# Patient Record
Sex: FEMALE | Race: WHITE | Employment: FULL TIME | URBAN - METROPOLITAN AREA
[De-identification: names, ages, dates, MRNs, and addresses within clinical notes are randomized per-mention and may not be internally consistent; named-entity substitution may affect disease eponyms.]

---

## 2017-03-01 ENCOUNTER — HOSPITAL ENCOUNTER (OUTPATIENT)
Dept: PHYSICAL THERAPY | Age: 47
Discharge: HOME | End: 2017-03-01
Payer: COMMERCIAL

## 2017-03-01 PROCEDURE — 97165 OT EVAL LOW COMPLEX 30 MIN: CPT

## 2017-03-01 PROCEDURE — 97018 PARAFFIN BATH THERAPY: CPT

## 2017-03-01 PROCEDURE — 97035 APP MDLTY 1+ULTRASOUND EA 15: CPT

## 2017-03-01 NOTE — PROGRESS NOTES
Hand Therapy Evaluation and Daily Note    Patient Name: Codey Sampson  Date:3/1/2017  : 1970  Age: 40 y.o.y/o  [x]  Patient  Verified  Payor: Elvis Jeter / Plan: [email or web address] / Product Type: HMO /    Referring Provider: MD Orion Ho MD Visit:  3 weeks  Onset Date:  2016- original injury,  Pain re-occurred 17    In time:1315  Out time:1415  Total Treatment Time (min): 60  Visit #: 1 of 8    Treatment Area: Left hand pain [M79.642]    Precautions: Latex    Hand Dominance: right handed   Hand Involved: left    Total Evaluation Time:  40    History of Present Condition:  Patient is a 40 y.o. female with a chief complaint of pain, edema and limited functional use of the left hand. Original injury was a hyperextension injury while trying to tighten the girth strap on a saddle while tacking up her horse. Pain re-occured approximately 1 week ago while doing other activities including lifting and carrying heavy objects. She reports it was a burning pain that continued in to the evening after the activities and now it hasn't resolved. Pain Rating:   Current: (0-no pain 10-debilitating pain) 4 / 10   At best: (0-no pain 10-debilitating pain) 4 / 10  At worst: (0-no pain 10-debilitating pain) 7 / 10  Location: left hand  Type:  moderate and localized   Better with: rest  Worse with: use    Medications/Allergies/Past Medical History:  See chart; reviewed with patient. Latex, asthma     Diagnostic Tests: x-ray- no fracture noted    Prior Level of Function: Independent without limitations in ADL and IADL activities. Current Level of Function:  Limited functional use of the left hand    Social History: , 2 children    Occupation/Job Requirements: Self employeed    Observation: Noted edema in MP jts of the IF and MF  Scar/incision:  NA  Location:  Left hand     Palpation:  Pain in the left hand at the MP jt.     Range of Motion:   AROM of the left hand in within normal limits    Strength:  NT    Sensation:   No problems reported    Edema: Mild edema noted at the MP jts on the left hand    Special Tests:    Provocative test: Positive pain with lateral stress test to the MF on the radial side. ADLs  Feeding:        []MaxA   []ModA   []Dutch   [] CGA   []SBA   [x]Carlie   []Independent  UE Dressing:       []MaxA   []ModA   []Dutch   [] CGA   []SBA   [x]Carlie   []Independent  LE Dressing:       []MaxA   []ModA   []Dutch   [] CGA   []SBA   [x]Carlie   []Independent  Grooming:       []MaxA   []ModA   []Dutch   [] CGA   []SBA   [x]Carlie   []Independent  Toileting:       []MaxA   []ModA   []Dutch   [] CGA   []SBA   [x]Carlie   []Independent  Bathing:       []MaxA   []ModA   []Dutch   [] CGA   []SBA   [x]Carlie   []Independent  Light Meal Prep:    []MaxA   []ModA   []Dutch   [] CGA   []SBA   [x]Carlie   []Independent  Household/Other: []MaxA   []ModA   []Dutch   [] CGA   []SBA   [x]Carlie   []Independent  Adaptive Equip:     []MaxA   []ModA   []Dutch   [] CGA   []SBA   []Carlie   []Independent  Driving:       []MaxA   []ModA   []Dutch   [] CGA   []SBA   [x]Carlie   []Independent      Todays Treatment:  Patient received an initial evaluation today followed by education as to diagnosis, precautions and treatment plan. Patient was provided with a basic home exercise program including ana m strapping with wear, care and precautions . OBJECTIVE    Modality rationale: decrease pain and increase tissue extensibility to improve the patients ability to flex and extend the digits of the left hand without pain.    Min Type Additional Details    [] Estim:  []Unatt       []IFC  []Premod                        []Other:  []w/ice   []w/heat  Position:  Location:    [] Estim: []Att    []TENS instruct  []NMES                    []Other:  []w/US   []w/ice   []w/heat  Position:  Location:    []  Traction: [] Cervical       []Lumbar                       [] Prone          []Supine                       []Intermittent []Continuous Lbs:  [] before manual  [] after manual   8 [x]  Ultrasound: [x]Continuous   [] Pulsed                           []1MHz   [x]3MHz W/cm2:  1.0  Location: Dorsum of the left hand    []  Iontophoresis with dexamethasone         Location: [] Take home patch   [] In clinic   10 []  Ice     [x]  Heat: Paraffin  []  Ice massage  []  Laser   []  Anodyne Position:resting  Location: left hand    []  Laser with stim  []  Other:  Position:  Location:    []  Vasopneumatic Device Pressure:       [] lo [] med [] hi   Temperature: [] lo [] med [] hi   [x] Skin assessment post-treatment:  [x]intact [x]redness- no adverse reaction    []redness - adverse reaction:       10 min Manual Therapy: IASTM with Graston tool # 3, using sweeping and fanning techniques   Rationale: decrease pain, increase tissue extensibility and decrease edema  to left hand    5 min Orthotic/Splinting: ana m wrap provided and patient educated on wear, care and precautions   Rationale: protection  to improve the patients ability to reduce stress and strain on soft tissue injury    5 min Self Care/Home Management: activity modification   Rationale: education  to improve the patients ability to reduce chances for re-injury. With   [] TE   [] TA   [] neuro   [] other: Patient Education: [x] Review HEP    [] Progressed/Changed HEP based on:   [] positioning   [] body mechanics   [] transfers   [] heat/ice application   [] Splint wear/care   [] Sensory re-education   [] scar management      [] other:      Pain Level (0-10 scale) post treatment: 2-3/10    Patient will continue to benefit from skilled OT services to address strength deficits and analyze and address soft tissue restrictions to attain goals. Assessment: Patient is a 40 y.o. female with a chief complaint of pain, edema and limited functional use of the left hand.   Original injury was a hyperextension injury while trying to tighten the girth strap on a saddle while tacking up her horse.  Pain re-occured approximately 1 week ago while doing other activities including lifting and carrying heavy objects. She reports it was a burning pain that continued in to the evening after the activities and now it hasn't resolved. Noted edema between the MP jts of the IF and MF. Positive pain with lateral stress test to the MF on the radial side. Patient received an initial evaluation today followed by education as to diagnosis, precautions and treatment plan and fitted with a ana m wrap for protection with functional tasks. Patient was provided with a basic home exercise program including ana m strapping with wear, care and precautions . Evaluation Complexity: History LOW Complexity : Brief history review  Examination LOW Complexity : 1-3 performance deficits relating to physical, cognitive , or psychosocial skils that result in activity limitations and / or participation restrictions  Clinical Decision Making LOW Complexity : No comorbidities that affect functional and no verbal or physical assistance needed to complete eval tasks   Overall Complexity Rating: LOW     Patient would benefit from OT/Hand therapy services for the following problems:  Problem List: Pain effecting function, Decreased strength and Edema effecting function     Treatment Plan may include any combination of the following: Therapeutic exercise, Physical agent/modality, Manual therapy, Splinting/orthoses and Patient education    Patient / Family readiness to learn indicated by: asking questions and trying to perform skills    Persons(s) to be included in education:   patient (P)    Barriers to Learning/Limitations: None    Patient Goal (s): reduce pain    Patient Self Reported Health Status: excellent    Rehabilitation Potential: excellent    Short Term Goals:  To be accomplished in 2  weeks:  Goal:* Patient will be compliant with initial home exercise program to take an active role in their rehabilitation process. Status at Eval:  Patient was provided with a basic home exercise program including ana m strapping with wear, care and precautions . Goal:* Patient will demonstrate a good understanding of their condition and strategies for self-management. Status at Eval: Patient received an initial evaluation today followed by education as to diagnosis, precautions and treatment plan and fitted with a ana m wrap for protection with functional tasks. Long Term Goals: To be accomplished in 4 weeks:   Goal:* Assess patient  and pinch strength. Goal:* Patient will show a 10 point improvement on FOTO functional status measure to improve overall functional performance. Status at Eval: 28    Goal:Patient will report a decrease in complaints of pain to 0/10  Status at Eval:4-5/10        Frequency / Duration: Patient to be seen 1-2 times per week for 4 weeks:    Patient/ Caregiver education and instruction: Diagnosis, prognosis, self care and brace/ splint application    Functional Status Measure:  Patient's:35  FOTO Benchmark: 52  Expected Change: 21  FOTO score based on 0 - 100 point scale, with 100 being no impairment.  These scores are determined by patient reported functional assessments compared against national benchmarked data.       Blanca Garrett OT, CHT 3/1/2017 1:19 PM

## 2017-03-02 NOTE — PROGRESS NOTES
[facility]  [address]  [place], [address]  [phone] fax    Plan of Care/Statement of Necessity for Occupational Therapy Services    Patient name: Noy Denis Start of Care: 3/1/2017   Referral source: Mary Kay Leslie MD : 1970    Medical Diagnosis: Left hand pain [M79.642]   Onset Date:2016, re-occurrence 1 week ago    Treatment Diagnosis: left hand pain   Prior Hospitalization: see medical history Provider#: [id number]   Medications: Verified on Patient summary List    Comorbidities: Latex, asthma    Prior Level of Function: Independent without limitations in ADL and IADL activities. The Plan of Care and following information is based on the information from the initial evaluation. Assessment/ key information: Patient is a 40 y.o. female with a chief complaint of pain, edema and limited functional use of the left hand. Original injury was a hyperextension injury while trying to tighten the girth strap on a saddle while tacking up her horse. Pain re-occured approximately 1 week ago while doing other activities including lifting and carrying heavy objects. She reports it was a burning pain that continued in to the evening after the activities and now it hasn't resolved. Noted edema between the MP jts of the IF and MF. Positive pain with lateral stress test to the MF on the radial side. Patient received an initial evaluation today followed by education as to diagnosis, precautions and treatment plan and fitted with a ana m wrap for protection with functional tasks. Patient was provided with a basic home exercise program including ana m strapping with wear, care and precautions .       Evaluation Complexity: History LOW Complexity : Brief history review  Examination LOW Complexity : 1-3 performance deficits relating to physical, cognitive , or psychosocial skils that result in activity limitations and / or participation restrictions  Clinical Decision Making LOW Complexity : No comorbidities that affect functional and no verbal or physical assistance needed to complete eval tasks   Overall Complexity Rating: LOW     Patient would benefit from OT/Hand therapy services for the following problems:  Problem List: Pain effecting function, Decreased strength and Edema effecting function     Treatment Plan may include any combination of the following: Therapeutic exercise, Physical agent/modality, Manual therapy, Splinting/orthoses and Patient education    Patient / Family readiness to learn indicated by: asking questions and trying to perform skills    Persons(s) to be included in education:   patient (P)    Barriers to Learning/Limitations: None    Patient Goal (s): reduce pain    Patient Self Reported Health Status: excellent    Rehabilitation Potential: excellent    Short Term Goals: To be accomplished in 2  weeks:  Goal:* Patient will be compliant with initial home exercise program to take an active role in their rehabilitation process. Status at [facility]:  Patient was provided with a basic home exercise program including ana m strapping with wear, care and precautions . Goal:* Patient will demonstrate a good understanding of their condition and strategies for self-management. Status at [facility]: Patient received an initial evaluation today followed by education as to diagnosis, precautions and treatment plan and fitted with a ana m wrap for protection with functional tasks. Long Term Goals: To be accomplished in 4 weeks:   Goal:* Assess patient  and pinch strength. Goal:* Patient will show a 10 point improvement on FOTO functional status measure to improve overall functional performance.   Status at [facility]: 28    Goal:Patient will report a decrease in complaints of pain to 0/10  Status at [facility]:4-5/10        Frequency / Duration: Patient to be seen 1-2 times per week for 4 weeks:    Patient/ Caregiver education and instruction: Diagnosis, prognosis, self care and brace/ splint application    Functional Status Measure:  Patient's:35  FOTO Benchmark: 52  Expected Change: 21  FOTO score based on 0 - 100 point scale, with 100 being no impairment. These scores are determined by patient reported functional assessments compared against national benchmarked data.       Jessica Bond OT, CHT 3/1/2017 1:19 PM      ________________________________________________________________________    I certify that the above Therapy Services are being furnished while the patient is under my care. I agree with the treatment plan and certify that this therapy is necessary.     Physician's Signature:____________________  Date:____________Time:__________    Please sign and return to [facility] [address] [place] [address] & [facility]  [address]  [place], [facility] [address]  [phone] fax

## 2017-03-06 ENCOUNTER — APPOINTMENT (OUTPATIENT)
Dept: PHYSICAL THERAPY | Age: 47
End: 2017-03-06
Payer: COMMERCIAL

## 2017-03-07 ENCOUNTER — HOSPITAL ENCOUNTER (OUTPATIENT)
Dept: PHYSICAL THERAPY | Age: 47
Discharge: HOME | End: 2017-03-07
Payer: COMMERCIAL

## 2017-03-07 PROCEDURE — 97140 MANUAL THERAPY 1/> REGIONS: CPT

## 2017-03-07 PROCEDURE — 97035 APP MDLTY 1+ULTRASOUND EA 15: CPT

## 2017-03-07 NOTE — PROGRESS NOTES
OT DAILY TREATMENT NOTE - [facility]     Patient Name: Cosme Garcia  Date:3/7/2017  : 1970  [x]  Patient  Verified  Payor: Angella Hooper / Plan: [employer] H[employer] / Product Type: HMO /    In time:1500  Out time:1540  Total Treatment Time (min): 40  Visit #: 2 of 8    Treatment Area: Left hand pain [M79.642]    SUBJECTIVE  Pain Level (0-10 scale): 0-2/10  Any medication changes, allergies to medications, adverse drug reactions, diagnosis change, or new procedure performed?: [x] No    [] Yes (see summary sheet for update)  Subjective functional status/changes:   [] No changes reported  \"If I baby it doesn't hurt. \"    OBJECTIVE    Modality rationale: decrease pain and increase tissue extensibility to improve the patients ability to move the fist without limitations   Min Type Additional Details    [] Estim:  []Unatt       []IFC  []Premod                        []Other:  []w/ice   []w/heat  Position:  Location:    [] Estim: []Att    []TENS instruct  []NMES                    []Other:  []w/US   []w/ice   []w/heat  Position:  Location:    []  Traction: [] Cervical       []Lumbar                       [] Prone          []Supine                       []Intermittent   []Continuous Lbs:  [] before manual  [] after manual   8 [x]  Ultrasound: [x]Continuous   [] Pulsed                           []1MHz   [x]3MHz W/cm2: 1.0  Location: left hand-dorsum    []  Iontophoresis with dexamethasone         Location: [] Take home patch   [] In clinic   15 []  Ice     [x]  heat  []  Ice massage  []  Laser   []  Anodyne Position: resting  Location: left hand    []  Laser with stim  []  Other:  Position:  Location:    []  Vasopneumatic Device Pressure:       [] lo [] med [] hi   Temperature: [] lo [] med [] hi   [x] Skin assessment post-treatment:  [x]intact []redness- no adverse reaction    []redness - adverse reaction:     2 min Therapeutic Exercise:  [x] See flow sheet :   Rationale: increase strength to improve the patients ability to move the digits    10 min Manual Therapy:  IASTM with Graston tool # 6, using sweeping technique   Rationale: decrease pain, increase tissue extensibility and decrease edema  to left hand    With   [] TE   [] TA   [] neuro   [] other: Patient Education: [x] Review HEP    [] Progressed/Changed HEP based on:   [] positioning   [] body mechanics   [] transfers   [] heat/ice application   [] Splint wear/care   [] Sensory re-education   [] scar management      [] other:            Other Objective/Functional Measures: NA     Pain Level (0-10 scale) post treatment: 0/10    ASSESSMENT/Changes in Function:  Patient reports decreased pain with IASTM today. Patient will continue to benefit from skilled OT services to address strength deficits and analyze and address soft tissue restrictions to attain remaining goals. [x]  See Plan of Care  []  See progress note/recertification  []  See Discharge Summary         Progress towards goals / Updated goals:  Short Term Goals: To be accomplished in 2  weeks:  Goal:* Patient will be compliant with initial home exercise program to take an active role in their rehabilitation process. Status at Eval: Patient was provided with a basic home exercise program including ana m strapping with wear, care and precautions .     Goal:* Patient will demonstrate a good understanding of their condition and strategies for self-management. Status at Eval: Patient received an initial evaluation today followed by education as to diagnosis, precautions and treatment plan and fitted with a ana m wrap for protection with functional tasks.      Long Term Goals: To be accomplished in 4 weeks:   Goal:* Assess patient  and pinch strength.     Goal:* Patient will show a 10 point improvement on FOTO functional status measure to improve overall functional performance.   Status at Eval: 28     Goal:Patient will report a decrease in complaints of pain to 0/10  Status at Eval:4-5/10    PLAN  []  Upgrade activities as tolerated     [x]  Continue plan of care  []  Update interventions per flow sheet       []  Discharge due to:_  []  Other:_      Lyudmila Teague OT 3/7/2017  4:13 PM    Future Appointments  Date Time Provider Thu Lynnette   3/10/2017 10:00 AM Lyudmila Teague OT [facility]   3/14/2017 10:00 AM [facility]   3/17/2017 10:15 AM Mindy Zayas MD [facility] [address]

## 2017-03-10 ENCOUNTER — HOSPITAL ENCOUNTER (OUTPATIENT)
Dept: PHYSICAL THERAPY | Age: 47
Discharge: HOME | End: 2017-03-10
Payer: COMMERCIAL

## 2017-03-10 PROCEDURE — 97035 APP MDLTY 1+ULTRASOUND EA 15: CPT

## 2017-03-10 PROCEDURE — 97110 THERAPEUTIC EXERCISES: CPT

## 2017-03-10 PROCEDURE — 97140 MANUAL THERAPY 1/> REGIONS: CPT

## 2017-03-10 NOTE — PROGRESS NOTES
OT DAILY TREATMENT NOTE  3-16    Patient Name: Chio Rossi  Date:3/10/2017  : 1970  [x]  Patient  Verified  Payor: Boni Needs / Plan: [employer] / Product Type: HMO /    In time:1000  Out time:1050  Total Treatment Time (min): 50  Visit #: 3 of 8    Treatment Area: Left hand pain [M79.642]    SUBJECTIVE  Pain Level (0-10 scale): 0/10  Any medication changes, allergies to medications, adverse drug reactions, diagnosis change, or new procedure performed?: [x] No    [] Yes (see summary sheet for update)  Subjective functional status/changes:   [] No changes reported  \"I think it is getting better. \"    OBJECTIVE    Modality rationale: decrease pain and increase tissue extensibility to improve the patients ability to move without pain.    Min Type Additional Details    [] Estim:  []Unatt       []IFC  []Premod                        []Other:  []w/ice   []w/heat  Position:  Location:    [] Estim: []Att    []TENS instruct  []NMES                    []Other:  []w/US   []w/ice   []w/heat  Position:  Location:    []  Traction: [] Cervical       []Lumbar                       [] Prone          []Supine                       []Intermittent   []Continuous Lbs:  [] before manual  [] after manual   8 [x]  Ultrasound: [x]Continuous   [] Pulsed                           []1MHz   [x]3MHz W/cm2: 1.0  Location:  Left MF dorsum of hand    []  Iontophoresis with dexamethasone         Location: [] Take home patch   [] In clinic   15 []  Ice     [x]  heat  []  Ice massage  []  Laser   []  Anodyne Position: resting  Location: left hand    []  Laser with stim  []  Other:  Position:  Location:    []  Vasopneumatic Device Pressure:       [] lo [] med [] hi   Temperature: [] lo [] med [] hi   [x] Skin assessment post-treatment:  [x]intact []redness- no adverse reaction    []redness - adverse reaction:     15 min Therapeutic Exercise:  [x] See flow sheet :   Rationale: increase ROM and increase strength to improve the patients ability to improve functional use without pain/limitations    10 min Manual Therapy:  IASTM with Graston tool # 6, using sweeping, fanning techniques   Rationale: decrease pain and increase tissue extensibility to left digts and hand    With   [] TE   [] TA   [] neuro   [] other: Patient Education: [x] Review HEP    [] Progressed/Changed HEP based on:   [] positioning   [] body mechanics   [] transfers   [] heat/ice application   [] Splint wear/care   [] Sensory re-education   [] scar management      [] other:             Other Objective/Functional Measures: Measurements: Taken with [employer] Dynamometer, in Lbs   Level 2 3/10/17 Date Date Date Date Date Date   Right 78         Left 57         Deficit          Change                Pinch Measurements: Taken with Pinch Gauge, in Lbs   (hand) 3/10/17 Date Date Date Date Date   Lateral          Right 15        Left  13        Deficit         Change         Pad         Right 16        Left 8        Deficit         Change         Ricardo         Right 17        Left 13        Deficit         Change             Pain Level (0-10 scale) post treatment: 0/10  ASSESSMENT/Changes in Function: reduced pain at rest, increased pain with functional prehension activities. Patient will continue to benefit from skilled OT services to modify and progress therapeutic interventions, address strength deficits and analyze and address soft tissue restrictions to attain remaining goals. [x]  See Plan of Care  []  See progress note/recertification  []  See Discharge Summary         Progress towards goals / Updated goals:  Short Term Goals: To be accomplished in 2 weeks:  Goal:* Patient will be compliant with initial home exercise program to take an active role in their rehabilitation process. Status at [facility]: Patient was provided with a basic home exercise program including ana m strapping with wear, care and precautions .   Status as last note/recert: Patient reports compliance        Goal:* Patient will demonstrate a good understanding of their condition and strategies for self-management. Status at Eval: Patient received an initial evaluation today followed by education as to diagnosis, precautions and treatment plan and fitted with a ana m wrap for protection with functional tasks. Status as last note/recert:  Patient verbalized understanding of precautions and progression of therapy        Long Term Goals: To be accomplished in 4 weeks:   Goal:* Assess patient  and pinch strength. Status as last note/recert: Goal met      Goal:* Patient will show a 10 point improvement on FOTO functional status measure to improve overall functional performance. Status at Eval: [address] will report a decrease in complaints of pain to 0/10  Status at [facility]:4-5/10  Status as last note/recert: 6/97 at rest, 7/75 with activities.       PLAN  []  Upgrade activities as tolerated     [x]  Continue plan of care  []  Update interventions per flow sheet       []  Discharge due to:_  []  Other:_      Sasha Regalado OT 3/10/2017  10:11 AM    Future Appointments  Date Time Provider Thu Shii   3/14/2017 10:00 AM Sasha Regalado OT [facility]   3/17/2017 10:15 AM Jelena Law MD [facility] [address]

## 2017-03-14 ENCOUNTER — HOSPITAL ENCOUNTER (OUTPATIENT)
Dept: PHYSICAL THERAPY | Age: 47
Discharge: HOME | End: 2017-03-14
Payer: COMMERCIAL

## 2017-03-14 PROCEDURE — 97110 THERAPEUTIC EXERCISES: CPT

## 2017-03-14 PROCEDURE — 97035 APP MDLTY 1+ULTRASOUND EA 15: CPT

## 2017-03-14 PROCEDURE — 97022 WHIRLPOOL THERAPY: CPT

## 2017-03-14 NOTE — PROGRESS NOTES
OT DAILY TREATMENT NOTE  3    Patient Name: Codey Sampson  Date:3/14/2017  : 1970  [x]  Patient  Verified  Payor: Elvis Jeter / Plan: [id number] [place] [employer] / Product Type: HMO /    In time:1000  Out time:1105  Total Treatment Time (min): 72  Visit #: 4 of 8    Treatment Area: Left hand pain [M79.642]    SUBJECTIVE  Pain Level (0-10 scale): 0/10  Any medication changes, allergies to medications, adverse drug reactions, diagnosis change, or new procedure performed?: [x] No    [] Yes (see summary sheet for update)  Subjective functional status/changes:   [x] No changes reported      OBJECTIVE    Modality rationale: decrease pain and increase tissue extensibility to improve the patients ability to move the hand without discomfort   Min Type Additional Details    [] Estim:  []Unatt       []IFC  []Premod                        []Other:  []w/ice   []w/heat  Position:  Location:    [] Estim: []Att    []TENS instruct  []NMES                    []Other:  []w/US   []w/ice   []w/heat  Position:  Location:    []  Traction: [] Cervical       []Lumbar                       [] Prone          []Supine                       []Intermittent   []Continuous Lbs:  [] before manual  [] after manual   10 [x]  Ultrasound: []Continuous   [x] Pulsed: 50%                           []1MHz   [x]3MHz W/cm2: 1.0  Location: left hand    []  Iontophoresis with dexamethasone         Location: [] Take home patch   [] In clinic   20 []  Ice     [x]  Heat: Fluido  []  Ice massage  []  Laser   []  Anodyne Position: AROM  Location: left hand    []  Laser with stim  []  Other:  Position:  Location:    []  Vasopneumatic Device Pressure:       [] lo [] med [] hi   Temperature: [] lo [] med [] hi   [x] Skin assessment post-treatment:  [x]intact []redness- no adverse reaction    []redness - adverse reaction:     30 min Therapeutic Exercise:  [x] See flow sheet :   Rationale: increase strength to improve the patients ability to increase strength and functional use. 5 min Manual Therapy:  IASTM with Graston tool # 3, using sweep technique   Rationale: decrease pain and increase tissue extensibility to left MP jt. With   [] TE   [] TA   [] neuro   [] other: Patient Education: [x] Review HEP    [] Progressed/Changed HEP based on:   [] positioning   [] body mechanics   [] transfers   [] heat/ice application   [] Splint wear/care   [] Sensory re-education   [] scar management      [] other:             Other Objective/Functional Measures:  Measurements: Taken with Kennedy Dynamometer, in Lbs  Level 2 3/10/17 Date Date Date Date Date Date   Right 66               Left 62               Deficit                 Change                     Pinch Measurements: Taken with Pinch Gauge, in Lbs   (hand) 3/10/17 Date Date Date Date Date   Lateral                Right 15             Left  13             Deficit               Change               Pad               Right 16             Left 8             Deficit               Change               Ricardo               Right 17             Left 13             Deficit               Change                     Pain Level (0-10 scale) post treatment: 0/10  ASSESSMENT/Changes in Function: Improved pain. Improved functional use at home.       Patient will continue to benefit from skilled OT services to modify and progress therapeutic interventions, address strength deficits and analyze and address soft tissue restrictions to attain remaining goals.      [x] See Plan of Care  [] See progress note/recertification  [] See Discharge Summary      Progress towards goals / Updated goals:  Short Term Goals: To be accomplished in 2 weeks:  Goal:* Patient will be compliant with initial home exercise program to take an active role in their rehabilitation process. Status at Eval: Patient was provided with a basic home exercise program including ana m strapping with wear, care and precautions .   Status as last note/recert: Patient reports compliance     Goal:* Patient will demonstrate a good understanding of their condition and strategies for self-management. Status at Eval: Patient received an initial evaluation today followed by education as to diagnosis, precautions and treatment plan and fitted with a ana m wrap for protection with functional tasks. Status as last note/recert: Patient verbalized understanding of precautions and progression of therapy         Long Term Goals: To be accomplished in 4 weeks:   Goal:* Assess patient  and pinch strength. Status as last note/recert: Goal met      Goal:* Patient will show a 10 point improvement on FOTO functional status measure to improve overall functional performance. Status at Eval: [address] [employer] will report a decrease in complaints of pain to 0/10  Status at [facility]:4-5/10  Status as last note/recert: 5/60 at rest, 9/88 with activities.     PLAN  []  Upgrade activities as tolerated     [x]  Continue plan of care  []  Update interventions per flow sheet       []  Discharge due to:_  []  Other:_      Blanca Garrett OT 3/14/2017  10:43 AM    Future Appointments  Date Time Provider [employer] [place]   3/17/2017 10:15 AM Idalia Bateman MD [facility] [address]

## 2017-04-03 NOTE — PROGRESS NOTES
[facility]  [address]  [place], [address]  [phone] fax    Occupational Therapy Discharge Summary    Patient name: Evelyn Mensah Start of Care: 3/1/17   Referral source: Schuyler Bence, MD : 1970   Medical/Treatment Diagnosis: Left hand pain [M79.642] Onset Date: 2016     Prior Hospitalization: see medical history Provider#: [id number]   Medications: Verified on Patient Summary List    Comorbidities: Latex, asthma   Prior Level of Function: Independent without limitations in ADL and IADL activities. Visits from Start of Care: 4    Missed Visits: 0  Reporting Period : *3/1/17-3/14/17    Summary of Care: Patient reported MD discharged from treatment, patient did not return for follow-up therefore, goals not addressed. Short Term Goals: To be accomplished in 2 weeks:  Goal:* Patient will be compliant with initial home exercise program to take an active role in their rehabilitation process. Status at [facility]: Patient was provided with a basic home exercise program including ana m strapping with wear, care and precautions . Status as last note/recert: Patient reports compliance      Goal:* Patient will demonstrate a good understanding of their condition and strategies for self-management. Status at [facility]: Patient received an initial evaluation today followed by education as to diagnosis, precautions and treatment plan and fitted with a ana m wrap for protection with functional tasks. Status as last note/recert: Patient verbalized understanding of precautions and progression of therapy          Long Term Goals: To be accomplished in 4 weeks:   Goal:* Assess patient  and pinch strength. Status as last note/recert: Goal met      Goal:* Patient will show a 10 point improvement on FOTO functional status measure to improve overall functional performance.   Status at [facility]:  [address] will report a decrease in complaints of pain to 0/10  Status at Eval:4-5/10  Status as last note/recert: 2/59 at rest, 9/41 with activities.   ASSESSMENT/RECOMMENDATIONS:  [x]Discontinue therapy: []Patient has reached or is progressing toward set goals      [x]Patient is non-compliant or has abdicated      []Due to lack of appreciable progress towards set goals    Fredy Alcantara OT 3/14/2017 9:27 AM

## 2017-06-18 ENCOUNTER — HOSPITAL ENCOUNTER (EMERGENCY)
Age: 47
Discharge: HOME | End: 2017-06-18
Attending: EMERGENCY MEDICINE
Payer: COMMERCIAL

## 2017-06-18 ENCOUNTER — APPOINTMENT (OUTPATIENT)
Dept: GENERAL RADIOLOGY | Age: 47
End: 2017-06-18
Attending: EMERGENCY MEDICINE
Payer: COMMERCIAL

## 2017-06-18 VITALS
RESPIRATION RATE: 18 BRPM | SYSTOLIC BLOOD PRESSURE: 111 MMHG | HEART RATE: 72 BPM | DIASTOLIC BLOOD PRESSURE: 76 MMHG | WEIGHT: 150 LBS | TEMPERATURE: 99 F | OXYGEN SATURATION: 100 %

## 2017-06-18 DIAGNOSIS — M79.672 LEFT FOOT PAIN: Primary | ICD-10-CM

## 2017-06-18 PROCEDURE — 73630 X-RAY EXAM OF FOOT: CPT

## 2017-06-18 PROCEDURE — 99283 EMERGENCY DEPT VISIT LOW MDM: CPT

## 2017-06-18 RX ORDER — ACETAMINOPHEN 500 MG
500 TABLET ORAL
Qty: 20 TAB | Refills: 0 | Status: SHIPPED | OUTPATIENT
Start: 2017-06-18 | End: 2019-04-01 | Stop reason: ALTCHOICE

## 2017-06-19 NOTE — ED PROVIDER NOTES
HPI Comments: Patient is a 41 y/o female who presents to the ER c/o left foot pain. Patient states that she was cleaning her house yesterday, when she was walking down the stairs backwards while cleaning the steps. Patient thinks she may have hurt her foot then. She denied any other slips/trips/falls. Patient has tried ice with no relief. She has not taken any medications for the pain. Patient is a 40 y.o. female presenting with foot injury. The history is provided by the patient. Foot Injury           History reviewed. No pertinent past medical history. Past Surgical History:   Procedure Laterality Date    HX BREAST LUMPECTOMY      HX BREAST RECONSTRUCTION      HX HYSTERECTOMY           History reviewed. No pertinent family history. Social History     Social History    Marital status:      Spouse name: N/A    Number of children: N/A    Years of education: N/A     Occupational History    Not on file. Social History Main Topics    Smoking status: Former Smoker    Smokeless tobacco: Not on file    Alcohol use Yes      Comment: occasionallyy    Drug use: No    Sexual activity: Not on file     Other Topics Concern    Not on file     Social History Narrative    No narrative on file         ALLERGIES: Latex; Aleve [naproxen sodium]; Motrin [ibuprofen]; and Naproxen    Review of Systems   Constitutional: Negative for chills, fatigue and fever. HENT: Negative. Negative for sore throat. Eyes: Negative. Respiratory: Negative for cough and shortness of breath. Cardiovascular: Negative for chest pain and palpitations. Gastrointestinal: Negative for abdominal pain, nausea and vomiting. Genitourinary: Negative for dysuria. Musculoskeletal: Positive for arthralgias. Left foot pain   Skin: Negative. Neurological: Negative for dizziness, weakness, light-headedness and headaches. Psychiatric/Behavioral: Negative.     All other systems reviewed and are negative. Vitals:    06/18/17 2035   BP: 111/76   Pulse: 72   Resp: 18   SpO2: 100%   Weight: 68 kg (150 lb)            Physical Exam   Constitutional: She is oriented to person, place, and time. She appears well-developed and well-nourished. No distress. HENT:   Head: Normocephalic and atraumatic. Mouth/Throat: Oropharynx is clear and moist.   Eyes: Conjunctivae are normal. No scleral icterus. Neck: Neck supple. No JVD present. No tracheal deviation present. Cardiovascular: Normal rate, regular rhythm and normal heart sounds. Pulmonary/Chest: Effort normal and breath sounds normal. No respiratory distress. She has no wheezes. Abdominal: Soft. She exhibits no distension. There is no tenderness. Musculoskeletal: Normal range of motion. Feet:    Neurological: She is alert and oriented to person, place, and time. She has normal strength. Gait abnormal. GCS eye subscore is 4. GCS verbal subscore is 5. GCS motor subscore is 6. Walks with slight limp   Skin: Skin is warm and dry. She is not diaphoretic. Psychiatric: She has a normal mood and affect. Nursing note and vitals reviewed. MDM  Number of Diagnoses or Management Options  Left foot pain:   Diagnosis management comments: 8:27 PM  39 y/o female c/o left foot pain onset yesterday and not improved since. Unsure of mechanism. Has tried ice with no relief. Will plan on xray and reeval.  Quinn Marroquin PA-C    8:59 PM  Discussed negative xray results with pt. Provided ace wrap to left foot. Advised RICE as needed and f/u with pcp in 1 week. All questions answered and patient in agreement with plan of care. Will plan for discharge.   Quinn Marroquin PA-C    Clinical Impression:  Foot pain         Amount and/or Complexity of Data Reviewed  Tests in the radiology section of CPT®: ordered and reviewed      ED Course       Procedures

## 2017-06-19 NOTE — ED NOTES
Pt instructed to rest/ice/elevate extremity, avoid trauma, and to return for worsening pain/other concerns.

## 2017-06-19 NOTE — DISCHARGE INSTRUCTIONS

## 2017-10-05 ENCOUNTER — TELEPHONE (OUTPATIENT)
Dept: INTERNAL MEDICINE CLINIC | Age: 47
End: 2017-10-05

## 2017-10-05 NOTE — TELEPHONE ENCOUNTER
Left message per rd ok to schedule new pt appt.      Ok to take Altaf Granger as new pt  [phone]  Npe whenever

## 2018-01-17 PROBLEM — K21.9 GERD (GASTROESOPHAGEAL REFLUX DISEASE): Status: ACTIVE | Noted: 2018-01-17

## 2018-02-01 ENCOUNTER — OFFICE VISIT (OUTPATIENT)
Dept: INTERNAL MEDICINE CLINIC | Age: 48
End: 2018-02-01

## 2018-02-01 VITALS
RESPIRATION RATE: 16 BRPM | TEMPERATURE: 97.7 F | SYSTOLIC BLOOD PRESSURE: 100 MMHG | HEIGHT: 62 IN | WEIGHT: 132 LBS | HEART RATE: 76 BPM | OXYGEN SATURATION: 98 % | DIASTOLIC BLOOD PRESSURE: 59 MMHG | BODY MASS INDEX: 24.29 KG/M2

## 2018-02-01 DIAGNOSIS — J30.9 ALLERGIC RHINITIS, UNSPECIFIED CHRONICITY, UNSPECIFIED SEASONALITY, UNSPECIFIED TRIGGER: ICD-10-CM

## 2018-02-01 DIAGNOSIS — G47.00 INSOMNIA, UNSPECIFIED TYPE: ICD-10-CM

## 2018-02-01 DIAGNOSIS — Z13.220 SCREENING FOR HYPERLIPIDEMIA: ICD-10-CM

## 2018-02-01 DIAGNOSIS — F41.9 ANXIETY: ICD-10-CM

## 2018-02-01 DIAGNOSIS — K21.9 GASTROESOPHAGEAL REFLUX DISEASE, ESOPHAGITIS PRESENCE NOT SPECIFIED: Primary | ICD-10-CM

## 2018-02-01 RX ORDER — HYOSCYAMINE SULFATE 0.125 MG
125 TABLET ORAL
COMMUNITY
End: 2020-10-30

## 2018-02-01 RX ORDER — FLUOXETINE HYDROCHLORIDE 20 MG/1
20 CAPSULE ORAL DAILY
COMMUNITY
End: 2018-03-21 | Stop reason: SDUPTHER

## 2018-02-01 RX ORDER — ZOLPIDEM TARTRATE 10 MG/1
5 TABLET ORAL
COMMUNITY
End: 2019-04-01 | Stop reason: ALTCHOICE

## 2018-02-01 NOTE — MR AVS SNAPSHOT
Britany James 
 
 
 [address] 
[phone] Patient: Mik Robbins MRN: [id number] Visit Information Date & Time Provider Department Dept. Phone Encounter #  
 2/1/2018  3:00 PM Neema Weldon MD [facility] [address] [place] [address] Road [phone] [id number] Upcoming Health Maintenance Date Due DTaP/Tdap/Td series (1 - Tdap) 7/13/1992 PAP AKA CERVICAL CYTOLOGY 7/13/1992 Influenza Age 5 to Adult 8/1/2017 Allergies as of 2/1/2018  Never Reviewed Severity Noted Reaction Type Reactions Latex  03/01/2017    Hives Aleve [Naproxen Sodium]  03/01/2017    Nausea and Vomiting Morphine  02/01/2018    Nausea and Vomiting Motrin [Ibuprofen]  06/18/2017    Nausea and Vomiting Naproxen  03/01/2017    Nausea and Vomiting Current Immunizations  Never Reviewed No immunizations on file. Not reviewed this visit You Were Diagnosed With   
  
 Codes Comments Screening for hyperlipidemia    -  Primary ICD-10-CM: Z13.220 ICD-9-CM: V77.91 Vitals BP Pulse Temp Resp Height(growth percentile) Weight(growth percentile) 100/59 76 97.7 °F (36.5 °C) 16 5' 2\" (1.575 m) 132 lb (59.9 kg) SpO2 BMI OB Status Smoking Status 98% 24.14 kg/m2 Hysterectomy Former Smoker Vitals History BMI and BSA Data Body Mass Index Body Surface Area  
 24.14 kg/m 2 1.62 m 2 Your Updated Medication List  
  
   
This list is accurate as of: 2/1/18  4:12 PM.  Always use your most recent med list.  
  
  
  
  
 acetaminophen 500 mg tablet Commonly known as:  TYLENOL Take 1 Tab by mouth every six (6) hours as needed for Pain. ACIPHEX PO Take 20 mg by mouth. AMBIEN 10 mg tablet Generic drug:  zolpidem Take  by mouth nightly as needed for Sleep (takes 1/2 nightly). FLUoxetine 20 mg capsule Commonly known as:  PROzac Take  by mouth daily. LEV/PSE/GG PO Take  by mouth. LEVSIN 0.125 mg tablet Generic drug:  hyoscyamine Take 125 mcg by mouth every four (4) hours as needed for Cramping. ZyrTEC 10 mg Cap Generic drug:  Cetirizine Take  by mouth. To-Do List   
 02/01/2018 Lab:  LIPID PANEL Introducing [facility] SERVICES! Dear Mellisa Shepard: Thank you for requesting a [employer] account. Our records indicate that you already have an active [employer] account. You can access your account anytime at [email or web address] Did you know that you can access your hospital and ER discharge instructions at any time in [employer]? You can also review all of your test results from your hospital stay or ER visit. Additional Information If you have questions, please visit the Frequently Asked Questions section of the [employer] website at [email or web address] Remember, [employer] is NOT to be used for urgent needs. For medical emergencies, dial 911. Now available from your iPhone and Android! Please provide this summary of care documentation to your next provider. Your primary care clinician is listed as Makayla Haynes. If you have any questions after today's visit, please call [phone].

## 2018-02-01 NOTE — PROGRESS NOTES
[facility]:  2/2/2018, MRN: [id number]      Jeancarlos Case is a 56 y.o. female and presents to clinic to Establish Care    Subjective: The patient is a 42-year-old female with history of allergic rhinitis, IBS, insomnia, and GERD. 1.  Allergic rhinitis: Present for years. She had allergy testing done several years ago. She notes that symptoms worsen with changes to the weather. Associated symptoms include nasal congestion and some ear fullness. CAT dander is a trigger. She has no history of anaphylactic episodes. 2.  Insomnia: Chronic condition, present for years. She has been on Ambien off and on for years (5 to be exact). He is on average once or twice a week. She has never had a sleep study. She uses typically 5 mg of Ambien nightly. No adverse side effects of taking Dayami Scott is reported. No drug use. No excessive alcohol beverage consumption. No tobacco use. 3.  GERD and IBS: Present for years. She is on AcipHex and reports no adverse side effects to take this medication. She is very concerned given the potential side effects on the drug label. She has been taking PPI therapy for at least 10 years. If she stops his medication, she developed burning chest pain, worsened by spicy food intake. She notes that symptoms worsened during pregnancy. She drinks 2 cups of coffee per day. She has been unsuccessful with controlling her symptoms with dietary changes alone. She also has a history of IBS. This runs in her family. She occasionally will have diarrhea that worsens with increased stress. She has never had endoscopy or colonoscopy. No hematochezia or melena. 4.  Anxiety: The patient had insomnia and increased tearfulness when coming off of oral contraceptive pills. She was placed on Prozac and has been on this for 15 years. She reports no adverse side effects of taking his medication. She denies depression anxiety symptoms while on this medication.   No refills are needed today. The patient is  but has remarried. 5. Health Maintenance:  - PAP: She has h/o hysterectomy given h/o heavy menses. No h/o abnormal PAPs. She still has her ovaries  - Mammogram: Overdue. Followed by her GYN team. No breast pain/masses. Has h/o implants. S/p lumpectomy for benign nodule. - Not a vegan/vegetarian.   - Exercises regularly - rides horses and walks them. Patient Active Problem List    Diagnosis Date Noted    Insomnia 02/02/2018    Anxiety 02/02/2018    Allergic rhinitis 02/02/2018    GERD (gastroesophageal reflux disease) 01/17/2018       Current Outpatient Prescriptions   Medication Sig Dispense Refill    Cetirizine (ZYRTEC) 10 mg cap Take  by mouth.  zolpidem (AMBIEN) 10 mg tablet Take  by mouth nightly as needed for Sleep (takes 1/2 nightly).  FLUoxetine (PROZAC) 20 mg capsule Take  by mouth daily.  GUAIFENESIN/PSEUDOEPHEDRNE HCL (LEV/PSE/GG PO) Take  by mouth.  hyoscyamine (LEVSIN) 0.125 mg tablet Take 125 mcg by mouth every four (4) hours as needed for Cramping.  RABEPRAZOLE SODIUM (ACIPHEX PO) Take 20 mg by mouth.  acetaminophen (TYLENOL) 500 mg tablet Take 1 Tab by mouth every six (6) hours as needed for Pain.  20 Tab 0       Allergies   Allergen Reactions    Latex Hives    Aleve [Naproxen Sodium] Nausea and Vomiting    Morphine Nausea and Vomiting    Motrin [Ibuprofen] Nausea and Vomiting    Naproxen Nausea and Vomiting       Past Medical History:   Diagnosis Date    GERD (gastroesophageal reflux disease)        Past Surgical History:   Procedure Laterality Date    HX BREAST LUMPECTOMY      HX BREAST RECONSTRUCTION      HX HEENT      jaw joint replacement left    HX HYSTERECTOMY         Family History   Problem Relation Age of Onset    Stroke Mother     Cancer Father     Diabetes Maternal Grandmother        Social History   Substance Use Topics    Smoking status: Former Smoker    Smokeless tobacco: Never Used    Alcohol use Yes      Comment: occasionallyy       ROS   Review of Systems   Constitutional: Negative for chills and fever. HENT: Negative for ear pain and sore throat. Eyes: Negative for blurred vision and pain. Respiratory: Negative for cough and shortness of breath. Cardiovascular: Negative for chest pain. Gastrointestinal: Positive for heartburn (when off PPI rx). Negative for abdominal pain, blood in stool and melena. Genitourinary: Negative for dysuria and hematuria. Musculoskeletal: Negative for joint pain and myalgias. Skin: Negative for rash. Neurological: Negative for tingling, focal weakness and headaches. Endo/Heme/Allergies: Positive for environmental allergies. Does not bruise/bleed easily. Psychiatric/Behavioral: Negative for substance abuse. Objective     Vitals:    02/01/18 1502   BP: 100/59   Pulse: 76   Resp: 16   Temp: 97.7 °F (36.5 °C)   SpO2: 98%   Weight: 132 lb (59.9 kg)   Height: 5' 2\" (1.575 m)   PainSc:   0 - No pain       Physical Exam   Constitutional: She is oriented to person, place, and time and well-developed, well-nourished, and in no distress. HENT:   Head: Normocephalic and atraumatic. Right Ear: External ear normal.   Left Ear: External ear normal.   Nose: Nose normal.   Mouth/Throat: Oropharynx is clear and moist. No oropharyngeal exudate. Clear TMs   Eyes: Conjunctivae and EOM are normal. Pupils are equal, round, and reactive to light. Right eye exhibits no discharge. Left eye exhibits no discharge. No scleral icterus. Neck: Neck supple. Cardiovascular: Normal rate, regular rhythm, normal heart sounds and intact distal pulses. Exam reveals no gallop and no friction rub. No murmur heard. Pulmonary/Chest: Effort normal and breath sounds normal. No respiratory distress. She has no wheezes. She has no rales. Abdominal: Soft. Bowel sounds are normal. She exhibits no distension. There is no tenderness. There is no rebound and no guarding. No hepatomegaly   Musculoskeletal: She exhibits no edema or tenderness (bue). Lymphadenopathy:     She has no cervical adenopathy. Neurological: She is alert and oriented to person, place, and time. She exhibits normal muscle tone. Gait normal.   Skin: Skin is warm and dry. No erythema. Psychiatric: Affect normal.   Nursing note and vitals reviewed. Assessment/Plan:   1. Health Maintenance:  -I encouraged patient to continue to follow with her gynecology team.  I encouraged her to get her routine breast cancer screening.  -Screening for hyperlipidemia with a lipid panel.  -I requesting records from her previous PCP, her vaccination records, and her last mammogram.    ORDERS:  - LIPID PANEL; Future    2. Insomnia: Has been on ambien for 5 yrs.  -We discussed nonpharmacological interventions to improve her insomnia-including the adrenal reset diet. I also encouraged her to try melatonin over-the-counter for symptomatic relief.  -We discussed the potential side effects of taking Ambien. All questions were answered. A controlled substance agreement was completed today. 3. Anxiety: Stable. On prozac for 15 yrs.  -Continue with Prozac as prescribed. 4. Allergic rhinitis: Stable.   -We discussed the therapeutic options for treatment of underlying allergic rhinitis. We discussed the use of antihistamines and nasal steroids. We also discussed the importance of avoiding triggers for her symptoms. All questions were answered. 5.  GERD:  -Continue with PPI therapy. We discussed the potential side effects of PPI therapy. All questions were answered. We discussed the complications that can arise from poorly controlled GERD. We discussed the need to go to the gastroenterology team in the event that the patient has worsening symptoms despite continued use of PPI therapy. We also discussed  ways to improve her symptoms with dietary changes.         Health Maintenance Due   Topic Date Due    DTaP/Tdap/Td series (1 - Tdap) 07/13/1992    PAP AKA CERVICAL CYTOLOGY  07/13/1992    Influenza Age 9 to Adult  08/01/2017     Lab review: labs are reviewed in the EHR    I have discussed the diagnosis with the patient and the intended plan as seen in the above orders. The patient has received an after-visit summary and questions were answered concerning future plans. I have discussed medication side effects and warnings with the patient as well. I have reviewed the plan of care with the patient, accepted their input and they are in agreement with the treatment goals. All questions were answered. The patient understands the plan of care. Handouts provided today with above information. Pt instructed if symptoms worsen to call the office or report to the ED for continued care. Greater than 50% of the visit time was spent in counseling and/or coordination of care. I spent 45 minutes of the patient for this encounter, 30 of which were spent counseling her as described above.       Follow-up Disposition: Not on File    Arthur Montoya MD

## 2018-02-02 PROBLEM — J30.9 ALLERGIC RHINITIS: Status: ACTIVE | Noted: 2018-02-02

## 2018-02-02 PROBLEM — G47.00 INSOMNIA: Status: ACTIVE | Noted: 2018-02-02

## 2018-02-02 PROBLEM — F41.9 ANXIETY: Status: ACTIVE | Noted: 2018-02-02

## 2018-03-21 ENCOUNTER — OFFICE VISIT (OUTPATIENT)
Dept: INTERNAL MEDICINE CLINIC | Age: 48
End: 2018-03-21

## 2018-03-21 ENCOUNTER — HOSPITAL ENCOUNTER (OUTPATIENT)
Dept: LAB | Age: 48
Discharge: HOME | End: 2018-03-21
Payer: COMMERCIAL

## 2018-03-21 VITALS
HEART RATE: 70 BPM | RESPIRATION RATE: 12 BRPM | SYSTOLIC BLOOD PRESSURE: 103 MMHG | BODY MASS INDEX: 24.25 KG/M2 | WEIGHT: 131.8 LBS | TEMPERATURE: 97.5 F | DIASTOLIC BLOOD PRESSURE: 61 MMHG | HEIGHT: 62 IN | OXYGEN SATURATION: 98 %

## 2018-03-21 DIAGNOSIS — Z13.220 SCREENING FOR HYPERLIPIDEMIA: ICD-10-CM

## 2018-03-21 DIAGNOSIS — F41.9 ANXIETY: Primary | ICD-10-CM

## 2018-03-21 LAB
CHOLEST SERPL-MCNC: 182 MG/DL
HDLC SERPL-MCNC: 58 MG/DL (ref 40–60)
HDLC SERPL: 3.1 {RATIO} (ref 0–5)
LDLC SERPL CALC-MCNC: 114.2 MG/DL (ref 0–100)
LIPID PROFILE,FLP: ABNORMAL
TRIGL SERPL-MCNC: 49 MG/DL (ref ?–150)
VLDLC SERPL CALC-MCNC: 9.8 MG/DL

## 2018-03-21 PROCEDURE — 36415 COLL VENOUS BLD VENIPUNCTURE: CPT | Performed by: INTERNAL MEDICINE

## 2018-03-21 PROCEDURE — 80061 LIPID PANEL: CPT | Performed by: INTERNAL MEDICINE

## 2018-03-21 RX ORDER — FLUOXETINE HYDROCHLORIDE 40 MG/1
20 CAPSULE ORAL DAILY
Qty: 30 CAP | Refills: 6 | Status: SHIPPED | OUTPATIENT
Start: 2018-03-21 | End: 2018-05-14 | Stop reason: SDUPTHER

## 2018-03-21 RX ORDER — RABEPRAZOLE SODIUM 20 MG/1
20 TABLET, DELAYED RELEASE ORAL DAILY
COMMUNITY
Start: 2018-02-22 | End: 2019-04-01 | Stop reason: SDUPTHER

## 2018-03-21 RX ORDER — GABAPENTIN 300 MG/1
300 CAPSULE ORAL 3 TIMES DAILY
COMMUNITY
End: 2018-03-21 | Stop reason: ALTCHOICE

## 2018-03-21 NOTE — PROGRESS NOTES
Chief Complaint   Patient presents with    Anxiety     due to father being very ill and will be moving in with her next week     Patient was given a copy of the Advanced Medical Directive form and understands to bring it in once completed. 1. Have you been to the ER, urgent care clinic since your last visit? Hospitalized since your last visit? No    2. Have you seen or consulted any other health care providers outside of the [address] [place] [address] since your last visit? Include any pap smears or colon screening.  No

## 2018-03-21 NOTE — MR AVS SNAPSHOT
[address] [facility] [address] 
 
 
 [address] [facility] [facility] [address] 
[phone] Patient: Kareem Ovalles MRN: [id number] IJZ:[birth year] Visit Information Date & Time Provider Department Dept. Phone Encounter #  
 3/21/2018  8:00 AM Aries Bustamante MD [facility] [phone] [id number] Follow-up Instructions Return in about 6 weeks (around 5/2/2018) for anxiety. Your Appointments 5/4/2018 10:00 AM  
Office Visit with Aries Bustamante MD  
[facility] [address]) Appt Note: 6 week f/u  
 [address] [facility], [address] Jonna Rast [address] [place]  
  
   
 [address], [address]  
  
    
 8/2/2018  1:30 PM  
Office Visit with Aries Bustamante MD  
[facility] [address]) Appt Note: 6 month f/u  
 [address] [facility], [address] [facility] [address]  
[phone] Upcoming Health Maintenance Date Due DTaP/Tdap/Td series (1 - Tdap) 7/13/1992 Influenza Age 5 to Adult 8/1/2017 Allergies as of 3/21/2018  Review Complete On: 3/21/2018 By: Lilly James Severity Noted Reaction Type Reactions Latex  03/01/2017    Hives Latex, Natural Rubber  10/08/2010    Rash Denies LATEX allergy -- states condoms caused itching and irritation Aleve [Naproxen Sodium]  03/01/2017    Nausea and Vomiting Morphine  09/27/2010    Nausea and Vomiting, Nausea Only Severe N/V Motrin [Ibuprofen]  06/18/2017    Nausea and Vomiting Naproxen  03/01/2017    Nausea and Vomiting Current Immunizations  Reviewed on 2/19/2018 Name Date Hep B Vaccine 6/8/2010, 11/10/2009, 10/13/2009 Not reviewed this visit You Were Diagnosed With   
  
 Codes Comments Anxiety    -  Primary ICD-10-CM: F41.9 ICD-9-CM: 300.00 Screening for hyperlipidemia     ICD-10-CM: Z13.220 ICD-9-CM: V77.91 Vitals BP Pulse Temp Resp Height(growth percentile) Weight(growth percentile) 103/61 (BP 1 Location: Right arm, BP Patient Position: Sitting) 70 97.5 °F (36.4 °C) (Oral) 12 5' 2\" (1.575 m) 131 lb 12.8 oz (59.8 kg) SpO2 BMI OB Status Smoking Status 98% 24.11 kg/m2 Hysterectomy Former Smoker Vitals History BMI and BSA Data Body Mass Index Body Surface Area  
 24.11 kg/m 2 1.62 m 2 Preferred Pharmacy Pharmacy Name Phone [id number] [address] [facility] [phone] Your Updated Medication List  
  
   
This list is accurate as of 3/21/18  8:46 AM.  Always use your most recent med list.  
  
  
  
  
 acetaminophen 500 mg tablet Commonly known as:  TYLENOL Take 1 Tab by mouth every six (6) hours as needed for Pain. AMBIEN 10 mg tablet Generic drug:  zolpidem Take 5 mg by mouth nightly as needed for Sleep (takes 1/2 nightly). FLUoxetine 40 mg capsule Commonly known as:  PROzac Take 1 Cap by mouth daily. LEVSIN 0.125 mg tablet Generic drug:  hyoscyamine Take 125 mcg by mouth every four (4) hours as needed. Indications: Irritable Bowel Syndrome RABEprazole 20 mg tablet Commonly known as:  ACIPHEX Take 20 mg by mouth daily. ZyrTEC 10 mg Cap Generic drug:  Cetirizine Take 10 mg by mouth daily. Prescriptions Sent to Pharmacy Refills FLUoxetine (PROZAC) 40 mg capsule 6 Sig: Take 1 Cap by mouth daily. Class: Normal  
 Pharmacy: [facility]/pharmacy [address] [place] [address],[address] #: [phone] Route: Oral  
  
Follow-up Instructions Return in about 6 weeks (around 5/2/2018) for anxiety. Patient Instructions Anxiety Disorder: Care Instructions Your Care Instructions Anxiety is a normal reaction to stress. Difficult situations can cause you to have symptoms such as sweaty palms and a nervous feeling. In an anxiety disorder, the symptoms are far more severe. Constant worry, muscle tension, trouble sleeping, nausea and diarrhea, and other symptoms can make normal daily activities difficult or impossible. These symptoms may occur for no reason, and they can affect your work, school, or social life. Medicines, counseling, and self-care can all help. Follow-up care is a key part of your treatment and safety. Be sure to make and go to all appointments, and call your doctor if you are having problems. It's also a good idea to know your test results and keep a list of the medicines you take. How can you care for yourself at home? · Take medicines exactly as directed. Call your doctor if you think you are having a problem with your medicine. · Go to your counseling sessions and follow-up appointments. · Recognize and accept your anxiety. Then, when you are in a situation that makes you anxious, say to yourself, \"This is not an emergency. I feel uncomfortable, but I am not in danger. I can keep going even if I feel anxious. \" · Be kind to your body: ¨ Relieve tension with exercise or a massage. ¨ Get enough rest. 
¨ Avoid alcohol, caffeine, nicotine, and illegal drugs. They can increase your anxiety level and cause sleep problems. ¨ Learn and do relaxation techniques. See below for more about these techniques. · Engage your mind. Get out and do something you enjoy. Go to a funny movie, or take a walk or hike. Plan your day. Having too much or too little to do can make you anxious. · Keep a record of your symptoms. Discuss your fears with a good friend or family member, or join a support group for people with similar problems. Talking to others sometimes relieves stress. · Get involved in social groups, or volunteer to help others. Being alone sometimes makes things seem worse than they are.  
· Get at least 30 minutes of exercise on most days of the week to relieve stress. Walking is a good choice. You also may want to do other activities, such as running, swimming, cycling, or playing tennis or team sports. Relaxation techniques Do relaxation exercises 10 to 20 minutes a day. You can play soothing, relaxing music while you do them, if you wish. · Tell others in your house that you are going to do your relaxation exercises. Ask them not to disturb you. · Find a comfortable place, away from all distractions and noise. · Lie down on your back, or sit with your back straight. · Focus on your breathing. Make it slow and steady. · Breathe in through your nose. Breathe out through either your nose or mouth. · Breathe deeply, filling up the area between your navel and your rib cage. Breathe so that your belly goes up and down. · Do not hold your breath. · Breathe like this for 5 to 10 minutes. Notice the feeling of calmness throughout your whole body. As you continue to breathe slowly and deeply, relax by doing the following for another 5 to 10 minutes: · Tighten and relax each muscle group in your body. You can begin at your toes and work your way up to your head. · Imagine your muscle groups relaxing and becoming heavy. · Empty your mind of all thoughts. · Let yourself relax more and more deeply. · Become aware of the state of calmness that surrounds you. · When your relaxation time is over, you can bring yourself back to alertness by moving your fingers and toes and then your hands and feet and then stretching and moving your entire body. Sometimes people fall asleep during relaxation, but they usually wake up shortly afterward. · Always give yourself time to return to full alertness before you drive a car or do anything that might cause an accident if you are not fully alert. Never play a relaxation tape while you drive a car. When should you call for help? Call 911 anytime you think you may need emergency care. For example, call if: ? · You feel you cannot stop from hurting yourself or someone else. ? Keep the numbers for these national suicide hotlines: 2-882-330-TALK (6-870.106.2223) and 3-170-BEAPSDI (0-967.687.3363). If you or someone you know talks about suicide or feeling hopeless, get help right away. ? Watch closely for changes in your health, and be sure to contact your doctor if: 
? · You have anxiety or fear that affects your life. ? · You have symptoms of anxiety that are new or different from those you had before. Where can you learn more? Go to http://robin-lamont.info/. Enter P754 in the search box to learn more about \"Anxiety Disorder: Care Instructions. \" Current as of: May 12, 2017 Content Version: 11.4 © 2549-6836 [employer]TE Energy. Care instructions adapted under license by MangoPlate (which disclaims liability or warranty for this information). If you have questions about a medical condition or this instruction, always ask your healthcare professional. [place] [address] any warranty or liability for your use of this information. Patient was given a copy of the Advanced Medical Directive form and understands to bring it in once completed. Health Maintenance Due Topic Date Due  
 DTaP/Tdap/Td series (1 - Tdap) 07/13/1992  Influenza Age 5 to Adult  08/01/2017 Introducing [facility]! Dear Zachary Barbosa: Thank you for requesting a AdMobius account. Our records indicate that you already have an active AdMobius account. You can access your account anytime at [email or web address] Did you know that you can access your hospital and ER discharge instructions at any time in AdMobius? You can also review all of your test results from your hospital stay or ER visit. Additional Information If you have questions, please visit the Frequently Asked Questions section of the DotNetNuke website at [email or web address] Remember, DotNetNuke is NOT to be used for urgent needs. For medical emergencies, dial 911. Now available from your iPhone and Android! Please provide this summary of care documentation to your next provider. Your primary care clinician is listed as Wilton Buckley. If you have any questions after today's visit, please call [phone].

## 2018-03-21 NOTE — PATIENT INSTRUCTIONS
Anxiety Disorder: Care Instructions  Your Care Instructions    Anxiety is a normal reaction to stress. Difficult situations can cause you to have symptoms such as sweaty palms and a nervous feeling. In an anxiety disorder, the symptoms are far more severe. Constant worry, muscle tension, trouble sleeping, nausea and diarrhea, and other symptoms can make normal daily activities difficult or impossible. These symptoms may occur for no reason, and they can affect your work, school, or social life. Medicines, counseling, and self-care can all help. Follow-up care is a key part of your treatment and safety. Be sure to make and go to all appointments, and call your doctor if you are having problems. It's also a good idea to know your test results and keep a list of the medicines you take. How can you care for yourself at home? · Take medicines exactly as directed. Call your doctor if you think you are having a problem with your medicine. · Go to your counseling sessions and follow-up appointments. · Recognize and accept your anxiety. Then, when you are in a situation that makes you anxious, say to yourself, \"This is not an emergency. I feel uncomfortable, but I am not in danger. I can keep going even if I feel anxious. \"  · Be kind to your body:  ¨ Relieve tension with exercise or a massage. ¨ Get enough rest.  ¨ Avoid alcohol, caffeine, nicotine, and illegal drugs. They can increase your anxiety level and cause sleep problems. ¨ Learn and do relaxation techniques. See below for more about these techniques. · Engage your mind. Get out and do something you enjoy. Go to a funny movie, or take a walk or hike. Plan your day. Having too much or too little to do can make you anxious. · Keep a record of your symptoms. Discuss your fears with a good friend or family member, or join a support group for people with similar problems. Talking to others sometimes relieves stress.   · Get involved in social groups, or volunteer to help others. Being alone sometimes makes things seem worse than they are. · Get at least 30 minutes of exercise on most days of the week to relieve stress. Walking is a good choice. You also may want to do other activities, such as running, swimming, cycling, or playing tennis or team sports. Relaxation techniques  Do relaxation exercises 10 to 20 minutes a day. You can play soothing, relaxing music while you do them, if you wish. · Tell others in your house that you are going to do your relaxation exercises. Ask them not to disturb you. · Find a comfortable place, away from all distractions and noise. · Lie down on your back, or sit with your back straight. · Focus on your breathing. Make it slow and steady. · Breathe in through your nose. Breathe out through either your nose or mouth. · Breathe deeply, filling up the area between your navel and your rib cage. Breathe so that your belly goes up and down. · Do not hold your breath. · Breathe like this for 5 to 10 minutes. Notice the feeling of calmness throughout your whole body. As you continue to breathe slowly and deeply, relax by doing the following for another 5 to 10 minutes:  · Tighten and relax each muscle group in your body. You can begin at your toes and work your way up to your head. · Imagine your muscle groups relaxing and becoming heavy. · Empty your mind of all thoughts. · Let yourself relax more and more deeply. · Become aware of the state of calmness that surrounds you. · When your relaxation time is over, you can bring yourself back to alertness by moving your fingers and toes and then your hands and feet and then stretching and moving your entire body. Sometimes people fall asleep during relaxation, but they usually wake up shortly afterward. · Always give yourself time to return to full alertness before you drive a car or do anything that might cause an accident if you are not fully alert.  Never play a relaxation tape while you drive a car. When should you call for help? Call 911 anytime you think you may need emergency care. For example, call if:  ? · You feel you cannot stop from hurting yourself or someone else. ? Keep the numbers for these national suicide hotlines: 3-536-297-TALK (9-763.854.7165) and 7-510-WEGGDAS (9-306.681.9978). If you or someone you know talks about suicide or feeling hopeless, get help right away. ? Watch closely for changes in your health, and be sure to contact your doctor if:  ? · You have anxiety or fear that affects your life. ? · You have symptoms of anxiety that are new or different from those you had before. Where can you learn more? Go to http://robin-lamont.info/. Enter P754 in the search box to learn more about \"Anxiety Disorder: Care Instructions. \"  Current as of: May 12, 2017  Content Version: 11.4  © 7347-2183 Vocalytics. Care instructions adapted under license by NetHooks (which disclaims liability or warranty for this information). If you have questions about a medical condition or this instruction, always ask your healthcare professional. [place] [address] any warranty or liability for your use of this information. Patient was given a copy of the Advanced Medical Directive form and understands to bring it in once completed.   Health Maintenance Due   Topic Date Due    DTaP/Tdap/Td series (1 - Tdap) 07/13/1992    Influenza Age 5 to Adult  08/01/2017

## 2018-03-21 NOTE — PROGRESS NOTES
[facility]:  3/21/2018, MRN: [id number]      Connor Gonzalez is a 56 y.o. female and presents to clinic for Anxiety (due to father being very ill and will be moving in with her next week)    Subjective: The patient is a 52-year-old female with history of allergic rhinitis, IBS, insomnia, trigeminal neuralgia (since her last appointment, the patientper ENT team), and GERD. Anxiety: Present for several years. She has been taking Prozac 20 mg daily for at least 15 years. She first reports symptoms after coming off of oral contraceptive pills. At that time she reported insomnia and increased tearfulness off and on. At her last appointment, her Prozac was working well to control her symptoms. No tobacco use. No ETOH beverage consumption. No drug use. Since her last appointment, the patient's father was diagnosed with influenza. He was subsequently hospitalized and discharged to a skilled nursing facility. The patient is not able to care for himself at this time. As result, the patient plans to move him from the skilled nursing facility in [place] to her home here in [place]. This is been a recent stressor for her. She reports no adverse side effects from Prozac in the past.  In the past, she took Xanax as needed for symptomatic relief. Note that she is already on Ambien for insomnia which she has been on for at least 5 years. Since her last appointment, she was diagnosed with trigeminal neuralgia per ENT team per her history. She was referred to neurology for evaluation. She was prescribed gabapentin but has yet to take this medication due to the potential side effects noted on the drug label. Her symptoms are off and on along her face. They resolve with massage.         Patient Active Problem List    Diagnosis Date Noted    Insomnia 02/02/2018    Anxiety 02/02/2018    Allergic rhinitis 02/02/2018    GERD (gastroesophageal reflux disease) 01/17/2018       Current Outpatient Prescriptions   Medication Sig Dispense Refill    RABEprazole (ACIPHEX) 20 mg tablet Take 20 mg by mouth daily.  FLUoxetine (PROZAC) 40 mg capsule Take 1 Cap by mouth daily. 30 Cap 6    Cetirizine (ZYRTEC) 10 mg cap Take 10 mg by mouth daily.  zolpidem (AMBIEN) 10 mg tablet Take 5 mg by mouth nightly as needed for Sleep (takes 1/2 nightly).  hyoscyamine (LEVSIN) 0.125 mg tablet Take 125 mcg by mouth every four (4) hours as needed. Indications: Irritable Bowel Syndrome      acetaminophen (TYLENOL) 500 mg tablet Take 1 Tab by mouth every six (6) hours as needed for Pain. 20 Tab 0       Allergies   Allergen Reactions    Latex Hives    Latex, Natural Rubber Rash     Denies LATEX allergy -- states condoms caused itching and irritation    Aleve [Naproxen Sodium] Nausea and Vomiting    Morphine Nausea and Vomiting and Nausea Only     Severe N/V    Motrin [Ibuprofen] Nausea and Vomiting    Naproxen Nausea and Vomiting       Past Medical History:   Diagnosis Date    GERD (gastroesophageal reflux disease)        Past Surgical History:   Procedure Laterality Date    HX BREAST LUMPECTOMY      HX BREAST RECONSTRUCTION      HX HEENT      jaw joint replacement left    HX HYSTERECTOMY         Family History   Problem Relation Age of Onset    Stroke Mother     Cancer Father     Diabetes Maternal Grandmother        Social History   Substance Use Topics    Smoking status: Former Smoker     Types: Cigarettes    Smokeless tobacco: Never Used    Alcohol use Yes      Comment: occasionallyy       ROS   Review of Systems   Constitutional: Negative for chills and fever. HENT: Negative for ear pain and sore throat. Eyes: Negative for blurred vision and pain. Respiratory: Negative for cough and shortness of breath. Cardiovascular: Negative for chest pain. Gastrointestinal: Negative for abdominal pain, blood in stool and melena. Genitourinary: Negative for dysuria and hematuria. Musculoskeletal: Negative for joint pain and myalgias. Skin: Negative for rash. Neurological: Negative for focal weakness. Endo/Heme/Allergies: Does not bruise/bleed easily. Psychiatric/Behavioral: Negative for substance abuse. The patient is nervous/anxious. Objective     Vitals:    03/21/18 0813   BP: 103/61   Pulse: 70   Resp: 12   Temp: 97.5 °F (36.4 °C)   TempSrc: Oral   SpO2: 98%   Weight: 131 lb 12.8 oz (59.8 kg)   Height: 5' 2\" (1.575 m)   PainSc:   0 - No pain       Physical Exam   Constitutional: She is oriented to person, place, and time and well-developed, well-nourished, and in no distress. HENT:   Head: Normocephalic and atraumatic. Right Ear: External ear normal.   Left Ear: External ear normal.   Nose: Nose normal.   Mouth/Throat: Oropharynx is clear and moist. No oropharyngeal exudate. Eyes: Conjunctivae and EOM are normal. Right eye exhibits no discharge. Left eye exhibits no discharge. No scleral icterus. Neck: Neck supple. Cardiovascular: Normal rate, regular rhythm, normal heart sounds and intact distal pulses. Exam reveals no gallop and no friction rub. No murmur heard. Pulmonary/Chest: Effort normal and breath sounds normal. No respiratory distress. She has no wheezes. She has no rales. Abdominal: Soft. Bowel sounds are normal. She exhibits no distension. There is no tenderness. There is no rebound and no guarding. Musculoskeletal: She exhibits no edema or tenderness (BUE). Lymphadenopathy:     She has no cervical adenopathy. Neurological: She is alert and oriented to person, place, and time. She exhibits normal muscle tone. Gait normal.   Skin: Skin is warm and dry. No erythema. Psychiatric: Affect normal.   Nursing note and vitals reviewed. Assessment/Plan:   1. Anxiety: Worsened since her last appointment. Likely situational.  Increasing her Prozac to 40 mg daily. We discussed healthy ways to cope with underlying stress.   I encourage the patient to schedule an appointment for talk therapy. Return to clinic to assess her response    2. Trigeminal neuralgia:  I encouraged her to follow-up with the neurology team.  If she is not having symptoms on a regular basis, she does not need to take gabapentin. We discussed the potential side effects of taking gabapentin. We also discussed the potential side effects of taking other medications for underlying trigeminal neuralgia. Health Maintenance Due   Topic Date Due    DTaP/Tdap/Td series (1 - Tdap) 07/13/1992    Influenza Age 5 to Adult  08/01/2017     Lab review: labs are reviewed in the EHR    I have discussed the diagnosis with the patient and the intended plan as seen in the above orders. The patient has received an after-visit summary and questions were answered concerning future plans. I have discussed medication side effects and warnings with the patient as well. I have reviewed the plan of care with the patient, accepted their input and they are in agreement with the treatment goals. All questions were answered. The patient understands the plan of care. Handouts provided today with above information. Pt instructed if symptoms worsen to call the office or report to the ED for continued care. Greater than 50% of the visit time was spent in counseling and/or coordination of care. Follow-up Disposition:  Return in about 6 weeks (around 5/2/2018) for anxiety.     Daysi Shaikh MD

## 2018-03-21 NOTE — ACP (ADVANCE CARE PLANNING)
Patient was given a copy of the Advanced Medical Directive form and understands to bring it in once completed.

## 2018-03-22 NOTE — PROGRESS NOTES
Please let her know that her 10 year CVD risk per the ACC/AHA risk assessment is 0.4%. Statin rx is not warranted at this time.     Dr. Wilton Cushing  [facility], [address] [place] [address], [address] [place] [address] Str.  Phone: [phone]  Fax: [phone]

## 2018-04-03 ENCOUNTER — HOSPITAL ENCOUNTER (OUTPATIENT)
Dept: MAMMOGRAPHY | Age: 48
Discharge: HOME | End: 2018-04-03
Attending: OBSTETRICS & GYNECOLOGY
Payer: COMMERCIAL

## 2018-04-03 ENCOUNTER — HOSPITAL ENCOUNTER (OUTPATIENT)
Age: 48
Discharge: HOME | End: 2018-04-03
Attending: PSYCHIATRY & NEUROLOGY
Payer: COMMERCIAL

## 2018-04-03 ENCOUNTER — HOSPITAL ENCOUNTER (OUTPATIENT)
Dept: LAB | Age: 48
Discharge: HOME | End: 2018-04-03

## 2018-04-03 DIAGNOSIS — G50.0 TRIGEMINAL NEURALGIA: ICD-10-CM

## 2018-04-03 DIAGNOSIS — G43.019 COMMON MIGRAINE WITH INTRACTABLE MIGRAINE: ICD-10-CM

## 2018-04-03 DIAGNOSIS — Z12.31 VISIT FOR SCREENING MAMMOGRAM: ICD-10-CM

## 2018-04-03 PROCEDURE — 99001 SPECIMEN HANDLING PT-LAB: CPT | Performed by: PSYCHIATRY & NEUROLOGY

## 2018-04-03 PROCEDURE — 70551 MRI BRAIN STEM W/O DYE: CPT

## 2018-04-03 PROCEDURE — 77067 SCR MAMMO BI INCL CAD: CPT

## 2018-05-14 ENCOUNTER — OFFICE VISIT (OUTPATIENT)
Dept: INTERNAL MEDICINE CLINIC | Age: 48
End: 2018-05-14

## 2018-05-14 VITALS
BODY MASS INDEX: 23.55 KG/M2 | HEART RATE: 65 BPM | RESPIRATION RATE: 16 BRPM | TEMPERATURE: 97.4 F | OXYGEN SATURATION: 98 % | WEIGHT: 128 LBS | SYSTOLIC BLOOD PRESSURE: 112 MMHG | HEIGHT: 62 IN | DIASTOLIC BLOOD PRESSURE: 75 MMHG

## 2018-05-14 DIAGNOSIS — F41.9 ANXIETY: ICD-10-CM

## 2018-05-14 RX ORDER — BUTALBITAL, ASPIRIN, AND CAFFEINE 325; 50; 40 MG/1; MG/1; MG/1
1 CAPSULE ORAL
COMMUNITY
End: 2020-10-30

## 2018-05-14 RX ORDER — FLUOXETINE HYDROCHLORIDE 40 MG/1
40 CAPSULE ORAL DAILY
Qty: 90 CAP | Refills: 3 | Status: SHIPPED | OUTPATIENT
Start: 2018-05-14 | End: 2019-05-23 | Stop reason: SDUPTHER

## 2018-05-14 NOTE — MR AVS SNAPSHOT
[address] [facility], [place] 
 
 
 [address], [address] [place] [address] [facility] 
[phone] Patient: Aliyah Guevara MRN: [id number] GGP:[id number] Visit Information Date & Time Provider Department Dept. Phone Encounter #  
 5/14/2018 12:00 PM Lynnette Kahn MD [facility] [place] [facility] [phone] Follow-up Instructions Return in about 6 months (around 11/14/2018) for anixety. Your Appointments 8/2/2018  1:30 PM  
Office Visit with Lynnette Kahn MD  
[facility] Appt Note: 6 month f/u  
 [address] [id number]  
  
   
 [address], [address] Upcoming Health Maintenance Date Due DTaP/Tdap/Td series (1 - Tdap) 7/13/1992 Influenza Age 5 to Adult 8/1/2018 Allergies as of 5/14/2018  Review Complete On: 5/14/2018 By: Lynnette Kahn MD  
  
 Severity Noted Reaction Type Reactions Latex  03/01/2017    Hives Latex, Natural Rubber  10/08/2010    Rash Denies LATEX allergy -- states condoms caused itching and irritation Aleve [Naproxen Sodium]  03/01/2017    Nausea and Vomiting Morphine  09/27/2010    Nausea and Vomiting, Nausea Only Severe N/V Motrin [Ibuprofen]  06/18/2017    Nausea and Vomiting Naproxen  03/01/2017    Nausea and Vomiting Current Immunizations  Reviewed on 2/19/2018 Name Date Hep B Vaccine 6/8/2010, 11/10/2009, 10/13/2009 Not reviewed this visit You Were Diagnosed With   
  
 Codes Comments Anxiety     ICD-10-CM: F41.9 ICD-9-CM: 300.00 Vitals BP Pulse Temp Resp Height(growth percentile) Weight(growth percentile) 112/75 65 97.4 °F (36.3 °C) 16 5' 2\" (1.575 m) 128 lb (58.1 kg) SpO2 BMI OB Status Smoking Status 98% 23.41 kg/m2 Hysterectomy Former Smoker Vitals History BMI and BSA Data Body Mass Index Body Surface Area 23.41 kg/m 2 1.59 m 2 Preferred Pharmacy Pharmacy Name Phone [facility]/PHARMACY #[id number] [facility], [facility],[address] Floor [facility] [phone] Your Updated Medication List  
  
   
This list is accurate as of 5/14/18 12:46 PM.  Always use your most recent med list.  
  
  
  
  
 acetaminophen 500 mg tablet Commonly known as:  TYLENOL Take 1 Tab by mouth every six (6) hours as needed for Pain. AMBIEN 10 mg tablet Generic drug:  zolpidem Take 5 mg by mouth nightly as needed for Sleep (takes 1/2 nightly). FIORINAL capsule Generic drug:  butalbital-aspirin-caffeine Take 1 Cap by mouth every four (4) hours as needed for Pain. FLUoxetine 40 mg capsule Commonly known as:  PROzac Take 1 Cap by mouth daily. LEVSIN 0.125 mg tablet Generic drug:  hyoscyamine Take 125 mcg by mouth every four (4) hours as needed. Indications: Irritable Bowel Syndrome RABEprazole 20 mg tablet Commonly known as:  ACIPHEX Take 20 mg by mouth daily. ZyrTEC 10 mg Cap Generic drug:  Cetirizine Take 10 mg by mouth daily. Prescriptions Sent to Pharmacy Refills FLUoxetine (PROZAC) 40 mg capsule 3 Sig: Take 1 Cap by mouth daily. Class: Normal  
 Pharmacy: [facility]/pharmacy [id number] [facility], [address] [place] [address],[address] #: [phone] Route: Oral  
  
Follow-up Instructions Return in about 6 months (around 11/14/2018) for anixety. Introducing [facility]! Dear Haydee Ray: Thank you for requesting a [employer] account. Our records indicate that you already have an active [employer] account. You can access your account anytime at [email or web address] Did you know that you can access your hospital and ER discharge instructions at any time in [employer]? You can also review all of your test results from your hospital stay or ER visit. Additional Information If you have questions, please visit the Frequently Asked Questions section of the [employer] website at [email or web address] Remember, [employer] is NOT to be used for urgent needs. For medical emergencies, dial 911. Now available from your iPhone and Android! Please provide this summary of care documentation to your next provider. Your primary care clinician is listed as Jason Shaver. If you have any questions after today's visit, please call [phone].

## 2018-05-14 NOTE — PROGRESS NOTES
[facility]:  5/24/2018, MRN: [id number]      Ruchi Lawrence is a 56 y.o. female and presents to clinic for Anxiety (feeling better)    Subjective: The patient is a 18-year-old female with history of allergic rhinitis, IBS, insomnia, trigeminal neuralgia (since her last appointment, the patientper ENT team), and GERD. Anxiety/Depression: Present for years. The patient with Prozac 20 mg daily for 15 years at least.  She reports symptoms of anxiety and depression when transitioning off of oral contraceptive pills. At that time, she had insomnia and increased tearfulness intermittently. No excessive alcohol beverage consumption. No drug use. No tobacco use. Since her last appointment, her father has moved to [place]. Her relationship with her father is very strong. Her marriage is strong. Her mother is a stressor though. She often will complain about various things. At her last appointment, her Prozac dose was increased from 20 mg daily to 40 mg daily. She continues to take Ambien as needed for insomnia symptoms. No adverse side effects to this medication. Since her last appointment, the patient prepared for being a primary caregiver to her parents. She has some medical equipment like a blood pressure machine at home. This makes her feel more comfortable with having them live in her home, given their comorbidities. Patient Active Problem List    Diagnosis Date Noted    Insomnia 02/02/2018    Anxiety 02/02/2018    Allergic rhinitis 02/02/2018    GERD (gastroesophageal reflux disease) 01/17/2018       Current Outpatient Prescriptions   Medication Sig Dispense Refill    butalbital-aspirin-caffeine (FIORINAL) capsule Take 1 Cap by mouth every four (4) hours as needed for Pain.  FLUoxetine (PROZAC) 40 mg capsule Take 1 Cap by mouth daily. 90 Cap 3    RABEprazole (ACIPHEX) 20 mg tablet Take 20 mg by mouth daily.  Cetirizine (ZYRTEC) 10 mg cap Take 10 mg by mouth daily.  zolpidem (AMBIEN) 10 mg tablet Take 5 mg by mouth nightly as needed for Sleep (takes 1/2 nightly).  hyoscyamine (LEVSIN) 0.125 mg tablet Take 125 mcg by mouth every four (4) hours as needed. Indications: Irritable Bowel Syndrome      acetaminophen (TYLENOL) 500 mg tablet Take 1 Tab by mouth every six (6) hours as needed for Pain. 20 Tab 0       Allergies   Allergen Reactions    Latex Hives    Latex, Natural Rubber Rash     Denies LATEX allergy -- states condoms caused itching and irritation    Aleve [Naproxen Sodium] Nausea and Vomiting    Morphine Nausea and Vomiting and Nausea Only     Severe N/V    Motrin [Ibuprofen] Nausea and Vomiting    Naproxen Nausea and Vomiting       Past Medical History:   Diagnosis Date    GERD (gastroesophageal reflux disease)        Past Surgical History:   Procedure Laterality Date    HX BREAST LUMPECTOMY      HX BREAST RECONSTRUCTION      HX HEENT      jaw joint replacement left    HX HYSTERECTOMY      IMPLANT BREAST SILICONE/EQ         Family History   Problem Relation Age of Onset    Stroke Mother     Cancer Father     Diabetes Maternal Grandmother        Social History   Substance Use Topics    Smoking status: Former Smoker     Types: Cigarettes    Smokeless tobacco: Never Used    Alcohol use Yes      Comment: occasionallyy       ROS   Review of Systems   Constitutional: Negative for chills and fever. HENT: Negative for ear pain and sore throat. Eyes: Negative for blurred vision and pain. Respiratory: Negative for cough and shortness of breath. Cardiovascular: Negative for chest pain. Gastrointestinal: Negative for abdominal pain, blood in stool and melena. Genitourinary: Negative for dysuria and hematuria. Musculoskeletal: Negative for joint pain and myalgias. Skin: Negative for rash. Neurological: Negative for tingling, focal weakness and headaches. Endo/Heme/Allergies: Does not bruise/bleed easily.    Psychiatric/Behavioral: Positive for depression. Negative for substance abuse. The patient is nervous/anxious. Sx are improving! Objective     Vitals:    05/14/18 1208   BP: 112/75   Pulse: 65   Resp: 16   Temp: 97.4 °F (36.3 °C)   SpO2: 98%   Weight: 128 lb (58.1 kg)   Height: 5' 2\" (1.575 m)   PainSc:   0 - No pain       Physical Exam   Constitutional: She is oriented to person, place, and time and well-developed, well-nourished, and in no distress. HENT:   Head: Normocephalic and atraumatic. Right Ear: External ear normal.   Left Ear: External ear normal.   Nose: Nose normal.   Mouth/Throat: Oropharynx is clear and moist. No oropharyngeal exudate. Eyes: Conjunctivae and EOM are normal. Pupils are equal, round, and reactive to light. Right eye exhibits no discharge. Left eye exhibits no discharge. No scleral icterus. Neck: Neck supple. Cardiovascular: Normal rate, regular rhythm, normal heart sounds and intact distal pulses. Exam reveals no gallop and no friction rub. No murmur heard. Pulmonary/Chest: Effort normal and breath sounds normal. No respiratory distress. She has no wheezes. She has no rales. Abdominal: Soft. Bowel sounds are normal. She exhibits no distension. There is no tenderness. There is no rebound and no guarding. Musculoskeletal: She exhibits no edema or tenderness (BUE). Lymphadenopathy:     She has no cervical adenopathy. Neurological: She is alert and oriented to person, place, and time. She exhibits normal muscle tone. Gait normal.   Skin: Skin is warm and dry. No erythema. Psychiatric: Affect normal.   Nursing note and vitals reviewed.       LABS   Data Review:   No results found for: WBC, WBCLT, HGBPOC, HGB, HGBP, HCTPOC, HCT, PHCT, RBCH, PLT, MCV, HGBEXT, HCTEXT, PLTEXT, HGBEXT, HCTEXT, PLTEXT    No results found for: NA, K, CL, CO2, AGAP, GLU, BUN, CREA, BUCR, GFRAA, GFRNA, CA, GFRAA    Lab Results   Component Value Date/Time    Cholesterol, total 182 03/21/2018 08:50 AM HDL Cholesterol 58 03/21/2018 08:50 AM    LDL, calculated 114.2 (H) 03/21/2018 08:50 AM    VLDL, calculated 9.8 03/21/2018 08:50 AM    Triglyceride 49 03/21/2018 08:50 AM    CHOL/HDL Ratio 3.1 03/21/2018 08:50 AM       No results found for: HBA1C, HGBE8, OCO2CQZY, WDH8BJMR, ASE9JKNG    Assessment/Plan:   1. Anxiety/Depression: Stable/improved since her last apt.  -I encouraged her to continue with counseling services. -Refilling her Prozac for 40 mg daily. We discussed coming down the dose at some point, pending her clinical course. ORDERS:  - FLUoxetine (PROZAC) 40 mg capsule; Take 1 Cap by mouth daily. Dispense: 90 Cap; Refill: 3    2. Health Maintenance:   - Will need to address breast cancer screening at her f/u apt. Health Maintenance Due   Topic Date Due    DTaP/Tdap/Td series (1 - Tdap) 07/13/1992     Lab review: labs reviewed in the EHR    I have discussed the diagnosis with the patient and the intended plan as seen in the above orders. The patient has received an after-visit summary and questions were answered concerning future plans. I have discussed medication side effects and warnings with the patient as well. I have reviewed the plan of care with the patient, accepted their input and they are in agreement with the treatment goals. All questions were answered. The patient understands the plan of care. Handouts provided today with above information. Pt instructed if symptoms worsen to call the office or report to the ED for continued care. Greater than 50% of the visit time was spent in counseling and/or coordination of care. Follow-up Disposition:  Return in about 6 months (around 11/14/2018) for anixety.     Ozzy Allen MD

## 2018-05-14 NOTE — PROGRESS NOTES
1. Have you been to the ER, urgent care clinic since your last visit? Hospitalized since your last visit? No    2. Have you seen or consulted any other health care providers outside of the [facility] since your last visit? Include any pap smears or colon screening.  No TCM telephone call done by Dr. Brian Lopez's staff.  TCM appointment for patient scheduled for tomorrow, April 27, 2017 at 3:00pm

## 2019-01-15 ENCOUNTER — HOSPITAL ENCOUNTER (OUTPATIENT)
Dept: LAB | Age: 49
Discharge: HOME | End: 2019-01-15

## 2019-01-15 LAB — XX-LABCORP SPECIMEN COL,LCBCF: NORMAL

## 2019-01-15 PROCEDURE — 99001 SPECIMEN HANDLING PT-LAB: CPT

## 2019-03-27 ENCOUNTER — HOSPITAL ENCOUNTER (OUTPATIENT)
Age: 49
Discharge: HOME | End: 2019-03-27
Payer: COMMERCIAL

## 2019-03-27 DIAGNOSIS — G43.009 COMMON MIGRAINE: ICD-10-CM

## 2019-03-27 PROCEDURE — 70551 MRI BRAIN STEM W/O DYE: CPT

## 2019-03-28 RX ORDER — RABEPRAZOLE SODIUM 20 MG/1
20 TABLET, DELAYED RELEASE ORAL DAILY
Qty: 30 TAB | Refills: 0 | Status: CANCELLED | OUTPATIENT
Start: 2019-03-28

## 2019-03-28 NOTE — TELEPHONE ENCOUNTER
PCP: Michael Jarvis MD    Last appt: 5/14/2018  No future appointments. Requested Prescriptions     Pending Prescriptions Disp Refills    RABEprazole (ACIPHEX) 20 mg tablet 30 Tab 0     Sig: Take 1 Tab by mouth daily. Patient required to have a f/u for refills.

## 2019-04-01 ENCOUNTER — OFFICE VISIT (OUTPATIENT)
Dept: INTERNAL MEDICINE CLINIC | Age: 49
End: 2019-04-01

## 2019-04-01 VITALS
BODY MASS INDEX: 24.55 KG/M2 | HEART RATE: 68 BPM | SYSTOLIC BLOOD PRESSURE: 104 MMHG | DIASTOLIC BLOOD PRESSURE: 62 MMHG | OXYGEN SATURATION: 98 % | TEMPERATURE: 97.5 F | RESPIRATION RATE: 12 BRPM | HEIGHT: 62 IN | WEIGHT: 133.4 LBS

## 2019-04-01 DIAGNOSIS — L08.9 SKIN PUSTULE: Primary | ICD-10-CM

## 2019-04-01 RX ORDER — TRIAMCINOLONE ACETONIDE 5 MG/G
CREAM TOPICAL 2 TIMES DAILY
Qty: 15 G | Refills: 0 | Status: SHIPPED | OUTPATIENT
Start: 2019-04-01 | End: 2020-10-30

## 2019-04-01 RX ORDER — RABEPRAZOLE SODIUM 20 MG/1
20 TABLET, DELAYED RELEASE ORAL DAILY
Qty: 90 TAB | Refills: 1 | Status: SHIPPED | OUTPATIENT
Start: 2019-04-01 | End: 2019-06-14 | Stop reason: SDUPTHER

## 2019-04-01 NOTE — PROGRESS NOTES
Eva Left 1970, is a 53 y.o. female, who is seen today for a pustule on her chin. She has been working in a bar and thought she might of gotten a bite a couple of days ago but it was much larger today and she squeezed pus out of it this morning. It has not been itchy or sore but is a little tender to touch. On her left side over the upper scapula there is a tiny raised area and she says this was similar to how the area on her chin  looked in the beginning. Past Medical History:  
Diagnosis Date  GERD (gastroesophageal reflux disease) Current Outpatient Medications Medication Sig Dispense Refill  RABEprazole (ACIPHEX) 20 mg tablet Take 1 Tab by mouth daily. 90 Tab 1  
 triamcinolone (ARISTOCORT) 0.5 % topical cream Apply  to affected area two (2) times a day. 15 g 0  
 butalbital-aspirin-caffeine (FIORINAL) capsule Take 1 Cap by mouth every four (4) hours as needed for Pain.  FLUoxetine (PROZAC) 40 mg capsule Take 1 Cap by mouth daily. 90 Cap 3  
 Cetirizine (ZYRTEC) 10 mg cap Take 10 mg by mouth daily.  hyoscyamine (LEVSIN) 0.125 mg tablet Take 125 mcg by mouth every four (4) hours as needed. Indications: Irritable Bowel Syndrome Visit Vitals /62 (BP 1 Location: Left arm, BP Patient Position: Sitting) Pulse 68 Temp 97.5 °F (36.4 °C) (Oral) Resp 12 Ht 5' 2\" (1.575 m) Wt 133 lb 6.4 oz (60.5 kg) SpO2 98% BMI 24.40 kg/m² There is a pustule on her chin and a tiny macule over the left scapula. Firm pressure on the lesion over the chin produces no pus but it is yellowish under the crust. 
 
Assessment: She may have had a bite but it is appears to be infected at this point. She will use warm compresses and press any pus out of it that may accumulate and will be treated with triamcinolone cream 0.5% twice a day to both of these areas. Oral antibiotics do not appear to be needed. Call if either of these worsen significantly. Nadeem Austin, [address] Please note: This document has been produced using voice recognition software. Unrecognized errors in transcription may be present. This visit lasted 15 minutes, greater than 50% of the time was spent counseling on these 2 lesions.

## 2019-04-01 NOTE — TELEPHONE ENCOUNTER
04-01-19 Patient reached and 2 identifiers were used: Full Name, and Date of Birth verified. The patient will be coming in today to see Dr Anthony Gibbons on another issue, and at that time will ask for a refill on the medication Aciphex 20 mg, and understands to make her follow up appointment to see Dr Nader Fine to discuss future refills. All understood.

## 2019-04-01 NOTE — PROGRESS NOTES
Chief Complaint Patient presents with  Insect Bite  
  x 1-2 days with insect bites on chin, back of Left Shoulder    ROOM   10  
 
 
1. Have you been to the ER, urgent care clinic since your last visit? Hospitalized since your last visit? No 
 
2. Have you seen or consulted any other health care providers outside of the [address] [place] [address] since your last visit? Include any pap smears or colon screening. No 
 
Patient was given a copy of the Advanced Directive and understands to bring it in once completed. Health Maintenance Due Topic Date Due  
 DTaP/Tdap/Td series (1 - Tdap) 07/13/1992

## 2019-05-20 ENCOUNTER — APPOINTMENT (OUTPATIENT)
Dept: GENERAL RADIOLOGY | Age: 49
End: 2019-05-20
Attending: PHYSICIAN ASSISTANT
Payer: COMMERCIAL

## 2019-05-20 ENCOUNTER — HOSPITAL ENCOUNTER (EMERGENCY)
Age: 49
Discharge: HOME | End: 2019-05-20
Attending: EMERGENCY MEDICINE
Payer: COMMERCIAL

## 2019-05-20 VITALS
HEIGHT: 61 IN | HEART RATE: 67 BPM | OXYGEN SATURATION: 99 % | BODY MASS INDEX: 22.66 KG/M2 | WEIGHT: 120 LBS | TEMPERATURE: 98.5 F | DIASTOLIC BLOOD PRESSURE: 47 MMHG | RESPIRATION RATE: 20 BRPM | SYSTOLIC BLOOD PRESSURE: 112 MMHG

## 2019-05-20 DIAGNOSIS — S90.01XA CONTUSION OF RIGHT ANKLE, INITIAL ENCOUNTER: Primary | ICD-10-CM

## 2019-05-20 PROCEDURE — 73630 X-RAY EXAM OF FOOT: CPT

## 2019-05-20 PROCEDURE — 73610 X-RAY EXAM OF ANKLE: CPT

## 2019-05-20 PROCEDURE — 99283 EMERGENCY DEPT VISIT LOW MDM: CPT

## 2019-05-20 PROCEDURE — 74011250637 HC RX REV CODE- 250/637: Performed by: PHYSICIAN ASSISTANT

## 2019-05-20 RX ORDER — OXYCODONE AND ACETAMINOPHEN 5; 325 MG/1; MG/1
1 TABLET ORAL
Status: COMPLETED | OUTPATIENT
Start: 2019-05-20 | End: 2019-05-20

## 2019-05-20 RX ORDER — IBUPROFEN 400 MG/1
800 TABLET ORAL
Status: DISCONTINUED | OUTPATIENT
Start: 2019-05-20 | End: 2019-05-21 | Stop reason: HOSPADM

## 2019-05-20 RX ORDER — ONDANSETRON 4 MG/1
4 TABLET, FILM COATED ORAL
Qty: 8 TAB | Refills: 0 | Status: SHIPPED | OUTPATIENT
Start: 2019-05-20 | End: 2020-10-30

## 2019-05-20 RX ORDER — ONDANSETRON 4 MG/1
4 TABLET, ORALLY DISINTEGRATING ORAL
Status: COMPLETED | OUTPATIENT
Start: 2019-05-20 | End: 2019-05-20

## 2019-05-20 RX ORDER — OXYCODONE AND ACETAMINOPHEN 5; 325 MG/1; MG/1
1 TABLET ORAL
Qty: 10 TAB | Refills: 0 | Status: SHIPPED | OUTPATIENT
Start: 2019-05-20 | End: 2019-05-25

## 2019-05-20 RX ADMIN — ONDANSETRON 4 MG: 4 TABLET, ORALLY DISINTEGRATING ORAL at 20:11

## 2019-05-20 RX ADMIN — OXYCODONE HYDROCHLORIDE AND ACETAMINOPHEN 1 TABLET: 5; 325 TABLET ORAL at 20:11

## 2019-05-21 NOTE — ED PROVIDER NOTES
HPI patient is a 76-year-old female who got kicked by horse this evening on her right ankle. She complains of having severe right ankle pain. She denied any other trauma to her body.     Past Medical History:   Diagnosis Date    GERD (gastroesophageal reflux disease)     IBS (irritable bowel syndrome)     Migraine        Past Surgical History:   Procedure Laterality Date    HX BREAST LUMPECTOMY      HX BREAST RECONSTRUCTION      HX HEENT      jaw joint replacement left    HX HYSTERECTOMY      IMPLANT BREAST SILICONE/EQ           Family History:   Problem Relation Age of Onset    Stroke Mother     Cancer Father     Diabetes Maternal Grandmother        Social History     Socioeconomic History    Marital status:      Spouse name: Not on file    Number of children: Not on file    Years of education: Not on file    Highest education level: Not on file   Occupational History    Not on file   Social Needs    Financial resource strain: Not on file    Food insecurity:     Worry: Not on file     Inability: Not on file    Transportation needs:     Medical: Not on file     Non-medical: Not on file   Tobacco Use    Smoking status: Former Smoker     Types: Cigarettes    Smokeless tobacco: Never Used   Substance and Sexual Activity    Alcohol use: Yes     Comment: occasionallyy    Drug use: No    Sexual activity: Yes   Lifestyle    Physical activity:     Days per week: Not on file     Minutes per session: Not on file    Stress: Not on file   Relationships    Social connections:     Talks on phone: Not on file     Gets together: Not on file     Attends Scientologist service: Not on file     Active member of club or organization: Not on file     Attends meetings of clubs or organizations: Not on file     Relationship status: Not on file    Intimate partner violence:     Fear of current or ex partner: Not on file     Emotionally abused: Not on file     Physically abused: Not on file     Forced sexual activity: Not on file   Other Topics Concern    Not on file   Social History Narrative    Not on file         ALLERGIES: Latex; Latex, natural rubber; Morphine; Aleve [naproxen sodium]; Motrin [ibuprofen]; and Naproxen    Review of Systems   Constitutional: Negative. HENT: Negative. Eyes: Negative. Respiratory: Negative. Cardiovascular: Negative. Gastrointestinal: Negative. Endocrine: Negative. Genitourinary: Negative. Musculoskeletal: Positive for arthralgias. Negative for joint swelling. Skin: Negative. Allergic/Immunologic: Negative. Neurological: Negative. Hematological: Negative. Psychiatric/Behavioral: Negative. All other systems reviewed and are negative. Vitals:    05/20/19 1948   BP: 112/47   Pulse: 67   Resp: 20   Temp: 98.5 °F (36.9 °C)   SpO2: 99%   Weight: 54.4 kg (120 lb)   Height: 5' 1\" (1.549 m)            Physical Exam   Constitutional: She is oriented to person, place, and time. She appears well-developed and well-nourished. No distress. HENT:   Head: Normocephalic. Right Ear: External ear normal.   Left Ear: External ear normal.   Mouth/Throat: No oropharyngeal exudate. Eyes: Pupils are equal, round, and reactive to light. Conjunctivae and EOM are normal. Right eye exhibits no discharge. Left eye exhibits no discharge. No scleral icterus. Neck: Normal range of motion. Neck supple. No JVD present. No tracheal deviation present. No thyromegaly present. Cardiovascular: Normal rate, regular rhythm, normal heart sounds and intact distal pulses. Exam reveals no gallop and no friction rub. No murmur heard. Pulmonary/Chest: Effort normal and breath sounds normal. No stridor. No respiratory distress. She has no wheezes. She has no rales. She exhibits no tenderness. Abdominal: Soft. Bowel sounds are normal. She exhibits no distension and no mass. There is no tenderness. There is no rebound and no guarding.    Musculoskeletal: Normal range of motion. She exhibits no edema or tenderness. RIGHT ANKLE: no edema, (+) severe tenderness over lateral posterior malleolus. Normal pulses, sensory and ROM. Lymphadenopathy:     She has no cervical adenopathy. Neurological: She is alert and oriented to person, place, and time. She displays normal reflexes. No cranial nerve deficit. She exhibits normal muscle tone. Coordination normal.   Skin: Skin is warm and dry. No rash noted. She is not diaphoretic. No erythema. No pallor. Nursing note and vitals reviewed.        MDM : Dif Dx: fx, contusion, dislocation     Dx: contusion of ankle

## 2019-05-23 DIAGNOSIS — F41.9 ANXIETY: ICD-10-CM

## 2019-05-23 RX ORDER — FLUOXETINE HYDROCHLORIDE 40 MG/1
40 CAPSULE ORAL DAILY
Qty: 90 CAP | Refills: 3 | Status: SHIPPED | OUTPATIENT
Start: 2019-05-23 | End: 2019-06-14 | Stop reason: SDUPTHER

## 2019-05-23 NOTE — TELEPHONE ENCOUNTER
Last Visit: 4/1/19 with MD Eloisa Clayton  Next Appointment: 6/14/19 with MD Angie Nagel  Previous Refill Encounter(s): 5/14/18 #90 with 3 refills    Requested Prescriptions     Pending Prescriptions Disp Refills    FLUoxetine (PROZAC) 40 mg capsule 90 Cap 3     Sig: Take 1 Cap by mouth daily.

## 2019-06-14 ENCOUNTER — OFFICE VISIT (OUTPATIENT)
Dept: INTERNAL MEDICINE CLINIC | Age: 49
End: 2019-06-14

## 2019-06-14 VITALS
BODY MASS INDEX: 25.26 KG/M2 | DIASTOLIC BLOOD PRESSURE: 66 MMHG | SYSTOLIC BLOOD PRESSURE: 106 MMHG | HEIGHT: 61 IN | TEMPERATURE: 97 F | WEIGHT: 133.8 LBS | HEART RATE: 70 BPM | OXYGEN SATURATION: 99 % | RESPIRATION RATE: 12 BRPM

## 2019-06-14 DIAGNOSIS — K21.9 GASTROESOPHAGEAL REFLUX DISEASE, ESOPHAGITIS PRESENCE NOT SPECIFIED: ICD-10-CM

## 2019-06-14 DIAGNOSIS — F41.9 ANXIETY: Primary | ICD-10-CM

## 2019-06-14 RX ORDER — RABEPRAZOLE SODIUM 20 MG/1
20 TABLET, DELAYED RELEASE ORAL DAILY
Qty: 90 TAB | Refills: 3 | Status: SHIPPED | OUTPATIENT
Start: 2019-06-14 | End: 2020-01-04

## 2019-06-14 RX ORDER — FLUOXETINE HYDROCHLORIDE 20 MG/1
20 CAPSULE ORAL DAILY
Qty: 90 CAP | Refills: 3 | Status: SHIPPED | OUTPATIENT
Start: 2019-06-14 | End: 2020-10-30

## 2019-06-14 NOTE — PATIENT INSTRUCTIONS
[facility] Health Maintenance Due Topic Date Due  
 DTaP/Tdap/Td series (1 - Tdap) 07/13/1992  
' Anxiety Disorder: Care Instructions Your Care Instructions Anxiety is a normal reaction to stress. Difficult situations can cause you to have symptoms such as sweaty palms and a nervous feeling. In an anxiety disorder, the symptoms are far more severe. Constant worry, muscle tension, trouble sleeping, nausea and diarrhea, and other symptoms can make normal daily activities difficult or impossible. These symptoms may occur for no reason, and they can affect your work, school, or social life. Medicines, counseling, and self-care can all help. Follow-up care is a key part of your treatment and safety. Be sure to make and go to all appointments, and call your doctor if you are having problems. It's also a good idea to know your test results and keep a list of the medicines you take. How can you care for yourself at home? · Take medicines exactly as directed. Call your doctor if you think you are having a problem with your medicine. · Go to your counseling sessions and follow-up appointments. · Recognize and accept your anxiety. Then, when you are in a situation that makes you anxious, say to yourself, \"This is not an emergency. I feel uncomfortable, but I am not in danger. I can keep going even if I feel anxious. \" · Be kind to your body: 
? Relieve tension with exercise or a massage. ? Get enough rest. 
? Avoid alcohol, caffeine, nicotine, and illegal drugs. They can increase your anxiety level and cause sleep problems. ? Learn and do relaxation techniques. See below for more about these techniques. · Engage your mind. Get out and do something you enjoy. Go to a funny movie, or take a walk or hike. Plan your day. Having too much or too little to do can make you anxious. · Keep a record of your symptoms.  Discuss your fears with a good friend or family member, or join a support group for people with similar problems. Talking to others sometimes relieves stress. · Get involved in social groups, or volunteer to help others. Being alone sometimes makes things seem worse than they are. · Get at least 30 minutes of exercise on most days of the week to relieve stress. Walking is a good choice. You also may want to do other activities, such as running, swimming, cycling, or playing tennis or team sports. Relaxation techniques Do relaxation exercises 10 to 20 minutes a day. You can play soothing, relaxing music while you do them, if you wish. · Tell others in your house that you are going to do your relaxation exercises. Ask them not to disturb you. · Find a comfortable place, away from all distractions and noise. · Lie down on your back, or sit with your back straight. · Focus on your breathing. Make it slow and steady. · Breathe in through your nose. Breathe out through either your nose or mouth. · Breathe deeply, filling up the area between your navel and your rib cage. Breathe so that your belly goes up and down. · Do not hold your breath. · Breathe like this for 5 to 10 minutes. Notice the feeling of calmness throughout your whole body. As you continue to breathe slowly and deeply, relax by doing the following for another 5 to 10 minutes: · Tighten and relax each muscle group in your body. You can begin at your toes and work your way up to your head. · Imagine your muscle groups relaxing and becoming heavy. · Empty your mind of all thoughts. · Let yourself relax more and more deeply. · Become aware of the state of calmness that surrounds you. · When your relaxation time is over, you can bring yourself back to alertness by moving your fingers and toes and then your hands and feet and then stretching and moving your entire body. Sometimes people fall asleep during relaxation, but they usually wake up shortly afterward. · Always give yourself time to return to full alertness before you drive a car or do anything that might cause an accident if you are not fully alert. Never play a relaxation tape while you drive a car. When should you call for help? Call 911 anytime you think you may need emergency care. For example, call if: 
  · You feel you cannot stop from hurting yourself or someone else.  
Sherrie Lawrence the numbers for these national suicide hotlines: 3-178-251-TALK (3-631.627.3195) and 9-582-QABYBQU (4-831.872.8862). If you or someone you know talks about suicide or feeling hopeless, get help right away. 
 Watch closely for changes in your health, and be sure to contact your doctor if: 
  · You have anxiety or fear that affects your life.  
  · You have symptoms of anxiety that are new or different from those you had before. Where can you learn more? Go to http://robin-lamont.info/. Enter P754 in the search box to learn more about \"Anxiety Disorder: Care Instructions. \" Current as of: September 11, 2018 Content Version: 11.9 © 2109-3500 [employer]. Care instructions adapted under license by [employer] (which disclaims liability or warranty for this information). If you have questions about a medical condition or this instruction, always ask your healthcare professional. [address] any warranty or liability for your use of this information.

## 2019-06-14 NOTE — PROGRESS NOTES
Chief Complaint   Patient presents with    Anxiety     follow up  ROOM 3       1. Have you been to the ER, urgent care clinic since your last visit? Hospitalized since your last visit? Yes When: 5-20-19 Where: [facility] ER Reason: Contusion Right Ankle. 2. Have you seen or consulted any other health care providers outside of the [address] [place] [address] since your last visit? Include any pap smears or colon screening. No    Patient was given a copy of the Advanced Directive and understands to bring it in once completed.   Health Maintenance Due   Topic Date Due    DTaP/Tdap/Td series (1 - Tdap) 07/13/1992

## 2019-06-14 NOTE — PROGRESS NOTES
[facility]:  6/14/2019, MRN: [id number]      Lenora Golden is a 53 y.o. female and presents to clinic for Anxiety (follow up  ROOM 3)    Subjective: The patient is a 50-year-old female with history of allergic rhinitis, IBS, insomnia, trigeminal neuralgia (since her last appointment, the patient per ENT team), and GERD. 1. Anxiety: She has a several year history of depression and anxiety symptoms. In the past, she was placed on Prozac 20 mg daily for 15 years at least.  Initially, symptoms of anxiety and depression developed as she transitioned off of OCPs. She is presently taking Prozac 40 mg daily. She is tolerating this medication well. It is working well to control her symptoms. No signs of alcohol beverage consumption, tobacco use, or drug use. She just came back from visiting a friend x 2 days. Her father has dementia. Her mother has been very \"negative\" towards her and her  in the past. Her parents are persistent stressors for her. They have been visiting family in [place]. They will be returning to [place] later this year.  plans to help her parents move in to a home here when they return to [place]. 2.  GERD: Present for years off/on. Well-controlled with PPI therapy. She needs a refill on her PPI. She reports no adverse side effects of use of her PPI. Patient Active Problem List    Diagnosis Date Noted    Insomnia 02/02/2018    Anxiety 02/02/2018    Allergic rhinitis 02/02/2018    GERD (gastroesophageal reflux disease) 01/17/2018       Current Outpatient Medications   Medication Sig Dispense Refill    FLUoxetine (PROZAC) 40 mg capsule Take 1 Cap by mouth daily. 90 Cap 3    RABEprazole (ACIPHEX) 20 mg tablet Take 1 Tab by mouth daily. 90 Tab 1    triamcinolone (ARISTOCORT) 0.5 % topical cream Apply  to affected area two (2) times a day. 15 g 0    butalbital-aspirin-caffeine (FIORINAL) capsule Take 1 Cap by mouth every four (4) hours as needed for Pain.       Cetirizine (ZYRTEC) 10 mg cap Take 10 mg by mouth daily.  ondansetron hcl (ZOFRAN) 4 mg tablet Take 1 Tab by mouth every eight (8) hours as needed for Nausea. 8 Tab 0    hyoscyamine (LEVSIN) 0.125 mg tablet Take 125 mcg by mouth every four (4) hours as needed. Indications: Irritable Bowel Syndrome         Allergies   Allergen Reactions    Latex Hives    Latex, Natural Rubber Rash and Itching     Denies LATEX allergy -- states condoms caused itching and irritation  Denies LATEX allergy -- states condoms caused itching and irritation    Other Food Other (comments)     MSG Severe Headache    Morphine Nausea and Vomiting, Nausea Only and Other (comments)     Severe N/V  Severe N/V      Aleve [Naproxen Sodium] Nausea and Vomiting    Motrin [Ibuprofen] Nausea and Vomiting    Naproxen Nausea and Vomiting       Past Medical History:   Diagnosis Date    GERD (gastroesophageal reflux disease)     IBS (irritable bowel syndrome)     Migraine        Past Surgical History:   Procedure Laterality Date    HX BREAST LUMPECTOMY      HX BREAST RECONSTRUCTION      HX HEENT      jaw joint replacement left    HX HYSTERECTOMY      IMPLANT BREAST SILICONE/EQ         Family History   Problem Relation Age of Onset    Stroke Mother     Cancer Father     Diabetes Maternal Grandmother        Social History     Tobacco Use    Smoking status: Former Smoker     Types: Cigarettes    Smokeless tobacco: Never Used   Substance Use Topics    Alcohol use: Yes     Comment: occasionallyy       ROS   Review of Systems   Constitutional: Negative for chills and fever. HENT: Negative for ear pain and sore throat. Eyes: Negative for blurred vision and pain. Respiratory: Negative for cough and shortness of breath. Cardiovascular: Negative for chest pain. Gastrointestinal: Negative for abdominal pain, blood in stool and melena. Genitourinary: Negative for dysuria and hematuria.    Musculoskeletal: Negative for joint pain and myalgias. Skin: Negative for rash. Neurological: Negative for tingling, focal weakness and headaches. Endo/Heme/Allergies: Does not bruise/bleed easily. Psychiatric/Behavioral: Negative for substance abuse. Objective     Vitals:    06/14/19 1050   BP: 106/66   Pulse: 70   Resp: 12   Temp: 97 °F (36.1 °C)   TempSrc: Oral   SpO2: 99%   Weight: 133 lb 12.8 oz (60.7 kg)   Height: 5' 1\" (1.549 m)   PainSc:   0 - No pain       Physical Exam   Constitutional: She is oriented to person, place, and time and well-developed, well-nourished, and in no distress. HENT:   Head: Normocephalic and atraumatic. Right Ear: External ear normal.   Left Ear: External ear normal.   Nose: Nose normal.   Mouth/Throat: Oropharynx is clear and moist. No oropharyngeal exudate. Eyes: Pupils are equal, round, and reactive to light. Conjunctivae and EOM are normal. Right eye exhibits no discharge. Left eye exhibits no discharge. No scleral icterus. Neck: Neck supple. Cardiovascular: Normal rate, regular rhythm, normal heart sounds and intact distal pulses. Exam reveals no gallop and no friction rub. No murmur heard. Pulmonary/Chest: Effort normal and breath sounds normal. No respiratory distress. She has no wheezes. She has no rales. Abdominal: Soft. Bowel sounds are normal. She exhibits no distension. There is no tenderness. Musculoskeletal: She exhibits no edema or tenderness (BUE). Lymphadenopathy:     She has no cervical adenopathy. Neurological: She is alert and oriented to person, place, and time. She exhibits normal muscle tone. Gait normal.   Skin: Skin is warm and dry. No erythema. Psychiatric: Affect normal.   Nursing note and vitals reviewed.       LABS   Lab Results   Component Value Date/Time    Cholesterol, total 182 03/21/2018 08:50 AM    HDL Cholesterol 58 03/21/2018 08:50 AM    LDL, calculated 114.2 (H) 03/21/2018 08:50 AM    VLDL, calculated 9.8 03/21/2018 08:50 AM    Triglyceride 49 03/21/2018 08:50 AM    CHOL/HDL Ratio 3.1 03/21/2018 08:50 AM     Assessment/Plan:   1. Anxiety: Stable. I refilling her Prozac. Discussed ways to reduce her stress. I encouraged her to exercise regularly. I will recheck her weight at her follow-up appointment. ORDERS:  - FLUoxetine (PROZAC) 20 mg capsule; Take 1 Cap by mouth daily. Dispense: 90 Cap; Refill: 3    3. Gastroesophageal reflux disease: Stable. Refilling her PPI therapy. ORDERS:  - RABEprazole (ACIPHEX) 20 mg tablet; Take 1 Tab by mouth daily. Dispense: 90 Tab; Refill: 3      Health Maintenance Due   Topic Date Due    DTaP/Tdap/Td series (1 - Tdap) 07/13/1992       Lab review: labs are reviewed in the EHR    I have discussed the diagnosis with the patient and the intended plan as seen in the above orders. The patient has received an after-visit summary and questions were answered concerning future plans. I have discussed medication side effects and warnings with the patient as well. I have reviewed the plan of care with the patient, accepted their input and they are in agreement with the treatment goals. All questions were answered. The patient understands the plan of care. Handouts provided today with above information. Pt instructed if symptoms worsen to call the office or report to the ED for continued care. Greater than 50% of the visit time was spent in counseling and/or coordination of care. Voice recognition was used to generate this report, which may have resulted in some phonetic based errors in grammar and contents. Even though attempts were made to correct all the mistakes, some may have been missed, and remained in the body of the document.           Ashia Calderon MD'

## 2020-01-04 DIAGNOSIS — K21.9 GASTROESOPHAGEAL REFLUX DISEASE, ESOPHAGITIS PRESENCE NOT SPECIFIED: ICD-10-CM

## 2020-01-04 RX ORDER — RABEPRAZOLE SODIUM 20 MG/1
TABLET, DELAYED RELEASE ORAL
Qty: 90 TAB | Refills: 3 | Status: SHIPPED | OUTPATIENT
Start: 2020-01-04 | End: 2020-10-30 | Stop reason: SDUPTHER

## 2020-06-06 DIAGNOSIS — F41.9 ANXIETY: ICD-10-CM

## 2020-06-08 RX ORDER — FLUOXETINE HYDROCHLORIDE 40 MG/1
CAPSULE ORAL
Qty: 90 CAP | Refills: 0 | Status: SHIPPED | OUTPATIENT
Start: 2020-06-08 | End: 2020-11-04 | Stop reason: SDUPTHER

## 2020-06-08 NOTE — TELEPHONE ENCOUNTER
Please schedule the patient for a follow-up appointment with me. No additional refills will be given after today. I have not seen her for a year.     Dr. Alonzo Officer  [facility] of [address], [address] [place] [address].  Phone: [phone]  Fax: [phone]

## 2020-09-04 ENCOUNTER — TELEPHONE (OUTPATIENT)
Dept: INTERNAL MEDICINE CLINIC | Age: 50
End: 2020-09-04

## 2020-09-04 NOTE — TELEPHONE ENCOUNTER
----- Message from Cisco Adams MD sent at 8/31/2020  7:13 AM EDT -----  Regarding: F/u apt needed  Please schedule for a 30 min virtual apt with me for med refills. I have not seen her in >1 yr.     Thanks,  Dr. David Peters  [facility] of [address], [address] [place] [address].  Phone: [phone]  Fax: [phone]

## 2020-09-04 NOTE — LETTER
9/8/2020 2:58 PM 
 
Ms. Garcia Sep 
[address] [facility] [address] Dear Ms. Tammi Gaines missed you! Please call our office at [phone] and schedule a follow up appointment for your continued care.  
 
 
 
Sincerely, 
 
 
Angel Galeano MD

## 2020-10-24 DIAGNOSIS — F41.9 ANXIETY: ICD-10-CM

## 2020-10-25 RX ORDER — FLUOXETINE HYDROCHLORIDE 40 MG/1
CAPSULE ORAL
Qty: 90 CAP | Refills: 0 | OUTPATIENT
Start: 2020-10-25

## 2020-10-25 NOTE — TELEPHONE ENCOUNTER
I have not seen this pt in >1 yr. No refills will be given w/o an apt. Please schedule for a virtual apt for refills of her rx.     Dr. Cali Jaimes  [facility], [address] [place] [address], [address] [place] [address] Str.  Phone: [phone]  Fax: [phone]

## 2020-10-30 ENCOUNTER — TELEPHONE (OUTPATIENT)
Dept: INTERNAL MEDICINE CLINIC | Age: 50
End: 2020-10-30

## 2020-10-30 ENCOUNTER — VIRTUAL VISIT (OUTPATIENT)
Dept: INTERNAL MEDICINE CLINIC | Age: 50
End: 2020-10-30
Payer: COMMERCIAL

## 2020-10-30 DIAGNOSIS — K21.9 GASTROESOPHAGEAL REFLUX DISEASE, UNSPECIFIED WHETHER ESOPHAGITIS PRESENT: ICD-10-CM

## 2020-10-30 DIAGNOSIS — Z12.31 ENCOUNTER FOR SCREENING MAMMOGRAM FOR BREAST CANCER: ICD-10-CM

## 2020-10-30 DIAGNOSIS — F41.9 ANXIETY: Primary | ICD-10-CM

## 2020-10-30 DIAGNOSIS — E78.5 HYPERLIPIDEMIA, UNSPECIFIED HYPERLIPIDEMIA TYPE: ICD-10-CM

## 2020-10-30 PROCEDURE — 99214 OFFICE O/P EST MOD 30 MIN: CPT | Performed by: INTERNAL MEDICINE

## 2020-10-30 RX ORDER — RABEPRAZOLE SODIUM 20 MG/1
20 TABLET, DELAYED RELEASE ORAL DAILY
Qty: 90 TAB | Refills: 3 | Status: SHIPPED | OUTPATIENT
Start: 2020-10-30 | End: 2021-11-01

## 2020-10-30 NOTE — PROGRESS NOTES
Katherine Zayas is a 53 y.o. female who was seen by synchronous (real-time) audio-video technology on 10/30/2020. Assessment & Plan:   1. Anxiety:  - Refilling her rx  - F/u with her in the office for a check up in a year. - Notify me if sx worsen     2. HLD: LDL was mildly elevated when checked in 2018. - Checking labs next yr    3. Health Maintenance:  - Mammogram ordered. 4. GERD: Stable. - Refilling her rx. Lab review: labs are reviewed in the EHR and ordered as mentioned above     I have discussed the diagnosis with the patient and the intended plan as seen in the above orders. I have discussed medication side effects and warnings with the patient as well. I have reviewed the plan of care with the patient, accepted their input and they are in agreement with the treatment goals. All questions were answered. The patient understands the plan of care. Pt instructed if symptoms worsen to call the office or report to the ED for continued care. Greater than 50% of the visit time was spent in counseling and/or coordination of care. Voice recognition was used to generate this report, which may have resulted in some phonetic based errors in grammar and contents. Even though attempts were made to correct all the mistakes, some may have been missed, and remained in the body of the document. Subjective:   Katherine Zayas was seen for   Chief Complaint   Patient presents with    Medication Refill     The patient is a 30-year-old female with history of allergic rhinitis, IBS, insomnia, trigeminal neuralgia (since her last appointment, the patient per ENT team), and GERD. 1. Anxiety:  Taking prozac. She has had depression and anxiety sx for yrs. Sx began initially as she transitioned off of OCPs. Tobacco/ETOH/drug use: none. 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 Her parents are still very fragile and she cares for them. No depression.  She wants to c/w rx as prescribed though given the stress of the pandemic, election, and caring for her parents. 2. Health Maintenance:  - Flu vaccine:She declines this vaccine. - Mammogram: overdue    3. GERD: Relieved with aciphex. She needs a refill    4. HLD: Last cholesterol was checked in 2018. Her LDL was mildly above 100. She is not on cholesterol lowering rx. Prior to Admission medications    Medication Sig Start Date End Date Taking? Authorizing Provider   FLUoxetine (PROzac) 40 mg capsule TAKE 1 CAPSULE BY MOUTH EVERY DAY 6/8/20   Emre Beckham MD   RABEprazole (ACIPHEX) 20 mg tablet TAKE 1 TABLET BY MOUTH EVERY DAY 1/4/20   Emre Beckham MD   FLUoxetine (PROZAC) 20 mg capsule Take 1 Cap by mouth daily. 6/14/19   Emre Beckham MD   ondansetron hcl (ZOFRAN) 4 mg tablet Take 1 Tab by mouth every eight (8) hours as needed for Nausea. 5/20/19   Aileen Dudley MD   triamcinolone (ARISTOCORT) 0.5 % topical cream Apply  to affected area two (2) times a day. 4/1/19   Verona Valero MD   butalbital-aspirin-caffeine [facility]) capsule Take 1 Cap by mouth every four (4) hours as needed for Pain. Provider, Historical   Cetirizine (ZYRTEC) 10 mg cap Take 10 mg by mouth daily. Provider, Historical   hyoscyamine (LEVSIN) 0.125 mg tablet Take 125 mcg by mouth every four (4) hours as needed.  Indications: Irritable Bowel Syndrome    Provider, Historical     Allergies   Allergen Reactions    Latex Hives    Latex, Natural Rubber Rash and Itching     Denies LATEX allergy -- states condoms caused itching and irritation  Denies LATEX allergy -- states condoms caused itching and irritation    Other Food Other (comments)     MSG Severe Headache    Morphine Nausea and Vomiting, Nausea Only and Other (comments)     Severe N/V  Severe N/V      Aleve [Naproxen Sodium] Nausea and Vomiting    Motrin [Ibuprofen] Nausea and Vomiting    Naproxen Nausea and Vomiting     Past Medical History:   Diagnosis Date  GERD (gastroesophageal reflux disease)     IBS (irritable bowel syndrome)     Migraine      Past Surgical History:   Procedure Laterality Date    HX BREAST LUMPECTOMY      HX BREAST RECONSTRUCTION      HX HEENT      jaw joint replacement left    HX HYSTERECTOMY      IMPLANT BREAST SILICONE/EQ       Family History   Problem Relation Age of Onset    Stroke Mother     Cancer Father     Diabetes Maternal Grandmother      Social History     Socioeconomic History    Marital status:      Spouse name: Not on file    Number of children: Not on file    Years of education: Not on file    Highest education level: Not on file   Tobacco Use    Smoking status: Former Smoker     Types: Cigarettes    Smokeless tobacco: Never Used   Substance and Sexual Activity    Alcohol use: Yes     Comment: occasionallyy    Drug use: No    Sexual activity: Yes       ROS:  Gen: No fever/chills  HEENT: No sore throat, eye pain, ear pain, or congestion.  No HA  CV: No CP  Resp: No cough/SOB  GI: No abdominal pain  : No hematuria/dysuria  Derm: No rash  Neuro: No new paresthesias/weakness  Musc: No new myalgias/jt pain  Psych: No depression sx      Objective:     General: alert, cooperative, no distress   Mental  status: mental status: alert, oriented to person, place, and time, normal mood, behavior, speech, dress, motor activity, and thought processes   Resp: resp: normal effort and no respiratory distress   Neuro: neuro: no gross deficits   Skin: skin: no discoloration or lesions of concern on visible areas     LABS:  No results found for: NA, K, CL, CO2, AGAP, GLU, BUN, CREA, BUCR, GFRAA, GFRNA, CA, GFRAA    Lab Results   Component Value Date/Time    Cholesterol, total 182 03/21/2018 08:50 AM    HDL Cholesterol 58 03/21/2018 08:50 AM    LDL, calculated 114.2 (H) 03/21/2018 08:50 AM    VLDL, calculated 9.8 03/21/2018 08:50 AM    Triglyceride 49 03/21/2018 08:50 AM    CHOL/HDL Ratio 3.1 03/21/2018 08:50 AM       No results found for: WBC, WBCLT, HGBPOC, HGB, HGBP, HCTPOC, HCT, PHCT, RBCH, PLT, MCV, HGBEXT, HCTEXT, PLTEXT    No results found for: HBA1C, HGBE8, NQE2TQKG, GTE6HUME    No results found for: TSH, TSH2, TSH3, TSHP, TSHEXT        Due to this being a TeleHealth  evaluation, many elements of the physical examination are unable to be assessed. The pt was seen by synchronous (real-time) audio-video technology, and/or her healthcare decision maker, is aware that this patient-initiated, Telehealth encounter is a billable service, with coverage as determined by her insurance carrier. She is aware that she may receive a bill and has provided verbal consent to proceed: Yes. The pt is being evaluated by a video visit encounter for concerns as above. A caregiver was present when appropriate. Due to this being a TeleHealth encounter (During [facility]-31 public health emergency), evaluation of the following organ systems was limited: Vitals/Constitutional/EENT/Resp/CV/GI//MS/Neuro/Skin/Heme-Lymph-Imm. Pursuant to the emergency declaration under the [facility], [facility] waiver authority and the [employer] and [employer] Act, this Virtual  Visit was conducted, with patient's (and/or legal guardian's) consent, to reduce the patient's risk of exposure to COVID-19 and provide necessary medical care. Services were provided through a video synchronous discussion virtually to substitute for in-person clinic visit. Patient and provider were located at their individual homes. We discussed the expected course, resolution and complications of the diagnosis(es) in detail. Medication risks, benefits, costs, interactions, and alternatives were discussed as indicated. I advised her to contact the office if her condition worsens, changes or fails to improve as anticipated. She expressed understanding with the diagnosis(es) and plan.      Darius Johnson MD

## 2020-10-30 NOTE — TELEPHONE ENCOUNTER
----- Message from Nancy Azar MD sent at 10/30/2020 12:16 PM EDT -----  Regarding: F/u apts needed in August  Please schedule for labs 1 wk before her in office visit in August. Please schedule a 30 min in office visit with me in August.    Thanks,  Dr. Robert Byrne  [facility], [address] [place] [address], [address] [place] [address] Str.  Phone: [phone]  Fax: [phone]

## 2020-11-04 DIAGNOSIS — F41.9 ANXIETY: ICD-10-CM

## 2020-11-04 RX ORDER — FLUOXETINE HYDROCHLORIDE 40 MG/1
CAPSULE ORAL
Qty: 90 CAP | Refills: 1 | Status: SHIPPED | OUTPATIENT
Start: 2020-11-04 | End: 2021-04-26

## 2020-11-04 NOTE — TELEPHONE ENCOUNTER
LOV 10/30/2020  Patient states her Fluoxetine was supposed to be sent in to her new [facility] pharmacy in [place] after her virtual appointment on Friday, but that it looks like the aciphex was sent in instead. She states she is almost out of fluoxetine.

## 2020-12-30 ENCOUNTER — HOSPITAL ENCOUNTER (OUTPATIENT)
Dept: MAMMOGRAPHY | Age: 50
Discharge: HOME | End: 2020-12-30
Attending: INTERNAL MEDICINE
Payer: COMMERCIAL

## 2020-12-30 PROCEDURE — 77063 BREAST TOMOSYNTHESIS BI: CPT

## 2021-01-04 DIAGNOSIS — R92.8 ABNORMALITY OF LEFT BREAST ON SCREENING MAMMOGRAM: Primary | ICD-10-CM

## 2021-01-04 DIAGNOSIS — R92.8 ABNORMALITY OF LEFT BREAST ON SCREENING MAMMOGRAM: ICD-10-CM

## 2021-01-04 NOTE — PROGRESS NOTES
Please let her know that the Radiologist is recommending additional pictures given an area of concern along her left breast. I am ordering a diagnostic mammogram and ultrasound as a result.     Dr. Niko Alvarez  [facility] of [address], [address] [place] [address] Str.  Phone: [phone]  Fax: [phone]

## 2021-01-05 ENCOUNTER — TELEPHONE (OUTPATIENT)
Dept: INTERNAL MEDICINE CLINIC | Age: 51
End: 2021-01-05

## 2021-01-05 NOTE — TELEPHONE ENCOUNTER
----- Message from Abilio Ames MD sent at 1/4/2021  4:22 PM EST -----  Please let her know that the Radiologist is recommending additional pictures given an area of concern along her left breast. I am ordering a diagnostic mammogram and ultrasound as a result.     Dr. Margarita Kearney  [facility] of [facility], [address] [place] [address], [address] [place] [address] Str.  Phone: [phone]  Fax: [phone]

## 2021-01-21 ENCOUNTER — HOSPITAL ENCOUNTER (OUTPATIENT)
Dept: MAMMOGRAPHY | Age: 51
Discharge: HOME | End: 2021-01-21
Attending: INTERNAL MEDICINE
Payer: COMMERCIAL

## 2021-01-21 ENCOUNTER — HOSPITAL ENCOUNTER (OUTPATIENT)
Dept: ULTRASOUND IMAGING | Age: 51
Discharge: HOME | End: 2021-01-21
Attending: INTERNAL MEDICINE
Payer: COMMERCIAL

## 2021-01-21 DIAGNOSIS — R92.8 ABNORMALITY OF LEFT BREAST ON SCREENING MAMMOGRAM: ICD-10-CM

## 2021-01-21 PROCEDURE — 77061 BREAST TOMOSYNTHESIS UNI: CPT

## 2021-01-21 PROCEDURE — 76642 ULTRASOUND BREAST LIMITED: CPT

## 2021-04-25 DIAGNOSIS — F41.9 ANXIETY: ICD-10-CM

## 2021-04-26 RX ORDER — FLUOXETINE HYDROCHLORIDE 40 MG/1
CAPSULE ORAL
Qty: 90 CAP | Refills: 1 | Status: SHIPPED | OUTPATIENT
Start: 2021-04-26 | End: 2021-10-07

## 2021-05-13 ENCOUNTER — TELEPHONE (OUTPATIENT)
Dept: INTERNAL MEDICINE CLINIC | Age: 51
End: 2021-05-13

## 2021-05-13 NOTE — TELEPHONE ENCOUNTER
Patient is scheduled for eye surgery with Dr. Keyur Alvarado on 6/7/21. Please advise when she can be seen for pre op.     [facility]

## 2021-05-14 ENCOUNTER — OFFICE VISIT (OUTPATIENT)
Dept: INTERNAL MEDICINE CLINIC | Age: 51
End: 2021-05-14
Payer: COMMERCIAL

## 2021-05-14 VITALS
SYSTOLIC BLOOD PRESSURE: 100 MMHG | TEMPERATURE: 97.7 F | RESPIRATION RATE: 18 BRPM | OXYGEN SATURATION: 99 % | BODY MASS INDEX: 25.3 KG/M2 | DIASTOLIC BLOOD PRESSURE: 61 MMHG | HEART RATE: 75 BPM | WEIGHT: 134 LBS | HEIGHT: 61 IN

## 2021-05-14 DIAGNOSIS — R53.83 FATIGUE, UNSPECIFIED TYPE: ICD-10-CM

## 2021-05-14 DIAGNOSIS — Z13.220 SCREENING FOR HYPERLIPIDEMIA: ICD-10-CM

## 2021-05-14 DIAGNOSIS — H53.8 BLURRY VISION: ICD-10-CM

## 2021-05-14 DIAGNOSIS — F41.9 ANXIETY: Primary | ICD-10-CM

## 2021-05-14 DIAGNOSIS — D64.9 ANEMIA, UNSPECIFIED TYPE: ICD-10-CM

## 2021-05-14 DIAGNOSIS — Z12.11 SCREENING FOR COLON CANCER: ICD-10-CM

## 2021-05-14 PROCEDURE — 99214 OFFICE O/P EST MOD 30 MIN: CPT | Performed by: INTERNAL MEDICINE

## 2021-05-14 RX ORDER — PREDNISOLONE ACETATE 10 MG/ML
SUSPENSION/ DROPS OPHTHALMIC
COMMUNITY
Start: 2021-05-11 | End: 2022-05-25

## 2021-05-14 RX ORDER — MOXIFLOXACIN 5 MG/ML
SOLUTION/ DROPS OPHTHALMIC
COMMUNITY
Start: 2021-05-11 | End: 2022-05-25

## 2021-05-14 RX ORDER — KETOROLAC TROMETHAMINE 5 MG/ML
SOLUTION OPHTHALMIC
COMMUNITY
Start: 2021-05-11 | End: 2022-05-25

## 2021-05-14 NOTE — PATIENT INSTRUCTIONS
Body Mass Index: Care Instructions Your Care Instructions Body mass index (BMI) can help you see if your weight is raising your risk for health problems. It uses a formula to compare how much you weigh with how tall you are. · A BMI lower than 18.5 is considered underweight. · A BMI between 18.5 and 24.9 is considered healthy. · A BMI between 25 and 29.9 is considered overweight. A BMI of 30 or higher is considered obese. If your BMI is in the normal range, it means that you have a lower risk for weight-related health problems. If your BMI is in the overweight or obese range, you may be at increased risk for weight-related health problems, such as high blood pressure, heart disease, stroke, arthritis or joint pain, and diabetes. If your BMI is in the underweight range, you may be at increased risk for health problems such as fatigue, lower protection (immunity) against illness, muscle loss, bone loss, hair loss, and hormone problems. BMI is just one measure of your risk for weight-related health problems. You may be at higher risk for health problems if you are not active, you eat an unhealthy diet, or you drink too much alcohol or use tobacco products. Follow-up care is a key part of your treatment and safety. Be sure to make and go to all appointments, and call your doctor if you are having problems. It's also a good idea to know your test results and keep a list of the medicines you take. How can you care for yourself at home? · Practice healthy eating habits. This includes eating plenty of fruits, vegetables, whole grains, lean protein, and low-fat dairy. · If your doctor recommends it, get more exercise. Walking is a good choice. Bit by bit, increase the amount you walk every day. Try for at least 30 minutes on most days of the week. · Do not smoke. Smoking can increase your risk for health problems. If you need help quitting, talk to your doctor about stop-smoking programs and medicines. These can increase your chances of quitting for good. · Limit alcohol to 2 drinks a day for men and 1 drink a day for women. Too much alcohol can cause health problems. If you have a BMI higher than 25 · Your doctor may do other tests to check your risk for weight-related health problems. This may include measuring the distance around your waist. A waist measurement of more than 40 inches in men or 35 inches in women can increase the risk of weight-related health problems. · Talk with your doctor about steps you can take to stay healthy or improve your health. You may need to make lifestyle changes to lose weight and stay healthy, such as changing your diet and getting regular exercise. If you have a BMI lower than 18.5 · Your doctor may do other tests to check your risk for health problems. · Talk with your doctor about steps you can take to stay healthy or improve your health. You may need to make lifestyle changes to gain or maintain weight and stay healthy, such as getting more healthy foods in your diet and doing exercises to build muscle. Where can you learn more? Go to http://www.lala.com/ Enter S176 in the search box to learn more about \"Body Mass Index: Care Instructions. \" Current as of: September 23, 2020               Content Version: 12.8 © 2006-2021 [employer]wise[employer]. Care instructions adapted under license by [employer] (which disclaims liability or warranty for this information). If you have questions about a medical condition or this instruction, always ask your healthcare professional. [place] [address] any warranty or liability for your use of this information.

## 2021-05-14 NOTE — PROGRESS NOTES
INTERNISTS OF [facility]:   Preoperative Evaluation    Date of Exam: 21    MRN: [id number]    Laz Chua  Is a 53 y.o.  female  who presents for preoperative evaluation and management. Chief Complaint   Patient presents with    Pre-op Exam     scheduled for Right eye cataract surgery on 21 W/ ([facility]). Fax preop to [phone]       Subjective:    The patient is a 52-year-old female with history of allergic rhinitis, IBS, insomnia, trigeminal neuralgia (since her last appointment, the patient per ENT team), and GERD.     1. Anxiety:  Present for yrs. Sx occurred after transitioning off of OCPs yrs ago. Since her last apt, she reports: Her father  in January from a DVT. She takes prozac and believes it is working well to control her sx. 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 +. 2. Fatigue: Her Hemoglobin was in the 8 range in October. +Fatigue recently. No SOB/CP. No bleeding. 3. Health Maintenance:  - Not vaccinated against Covid-19    4. Cataract Hx: +Blurry vision. Wears glasses. General Information:  Procedure/Surgery: cataract surgery  Primary Physician: Reshma Javier MD  Surgery status: Elective  Surgery risk: Low (endoscopy, cataract, breast, superficial procedure)    Cardiovascular Risk Factors:  1. Coronary revascularization within 5 years: no  2. Recurrent chest pain: no  3. Shortness of breath:  no  4. Recent coronary evaluation/stress test/angiogram:  no  5. Recent MI (less than 1 month ago):  no  6. Prior MI (by way of history or pathological waves):  no  7. Compensated CHF or h/o CHF:  no  8. Severe valvular disease:  no  9. Decompensated CHF:  no  10. High-grade atrioventricular block:  no  11. Arrhythmia:  no    Other Risk Factors:  1. Diabetes hx:  no  2. H/o CVA:  no  3. Uncontrolled hypertension:  no  4, Advanced age:  no  5.  Low functional capacity: no  6. Recent use of: No recent use of aspirin (ASA), NSAIDS or steroids  7. Tetanus up to date: tetanus status unknown to the patient  8. Anesthesia Complications: None  9. History of abnormal bleeding : None  10. History of Blood Transfusions: no  11. Health Care Directive or Living Will: no  12. Latex Allergy: yes      Problem List:     Patient Active Problem List    Diagnosis Date Noted    Insomnia 02/02/2018    Anxiety 02/02/2018    Allergic rhinitis 02/02/2018    GERD (gastroesophageal reflux disease) 01/17/2018     Medical History:     Past Medical History:   Diagnosis Date    GERD (gastroesophageal reflux disease)     IBS (irritable bowel syndrome)     Migraine      Allergies: Allergies   Allergen Reactions    Latex Hives    Latex, Natural Rubber Rash and Itching     Denies LATEX allergy -- states condoms caused itching and irritation  Denies LATEX allergy -- states condoms caused itching and irritation    Other Food Other (comments)     MSG Severe Headache    Morphine Nausea and Vomiting, Nausea Only and Other (comments)     Severe N/V  Severe N/V      Aleve [Naproxen Sodium] Nausea and Vomiting    Motrin [Ibuprofen] Nausea and Vomiting    Naproxen Nausea and Vomiting      Medications:     Current Outpatient Medications   Medication Sig    FLUoxetine (PROzac) 40 mg capsule TAKE 1 CAPSULE BY MOUTH EVERY DAY    RABEprazole (ACIPHEX) 20 mg tablet Take 1 Tab by mouth daily.  Cetirizine (ZYRTEC) 10 mg cap Take 10 mg by mouth daily.  prednisoLONE acetate (PRED FORTE) 1 % ophthalmic suspension     moxifloxacin (VIGAMOX) 0.5 % ophthalmic solution     ketorolac (ACULAR) 0.5 % ophthalmic solution      No current facility-administered medications for this visit.       Surgical History:     Past Surgical History:   Procedure Laterality Date    HX BREAST LUMPECTOMY      HX BREAST RECONSTRUCTION      HX HEENT      jaw joint replacement left    HX HYSTERECTOMY      IMPLANT BREAST SILICONE/EQ       Social History:     Social History     Socioeconomic History    Marital status:      Spouse name: Not on file    Number of children: Not on file    Years of education: Not on file    Highest education level: Not on file   Tobacco Use    Smoking status: Former Smoker     Types: Cigarettes    Smokeless tobacco: Never Used   Substance and Sexual Activity    Alcohol use: Yes     Comment: occasionallyy    Drug use: No    Sexual activity: Yes         REVIEW OF SYSTEMS:  Review of Systems   Constitutional: Negative for chills and fever. HENT: Negative for ear pain and sore throat. Eyes: Positive for blurred vision. Negative for pain. Respiratory: Negative for cough and shortness of breath. Cardiovascular: Negative for chest pain. Gastrointestinal: Negative for abdominal pain, blood in stool and melena. Genitourinary: Negative for dysuria and hematuria. Musculoskeletal: Negative for joint pain and myalgias. Skin: Negative for rash. Neurological: Negative for tingling, focal weakness and headaches. Endo/Heme/Allergies: Does not bruise/bleed easily. Psychiatric/Behavioral: Negative for substance abuse. Objective:     Vitals:    05/14/21 1024   BP: 100/61   Pulse: 75   Resp: 18   Temp: 97.7 °F (36.5 °C)   TempSrc: Temporal   SpO2: 99%   Weight: 134 lb (60.8 kg)   Height: 5' 1\" (1.549 m)   PainSc:   0 - No pain       Physical Exam  Vitals and nursing note reviewed. HENT:      Head: Normocephalic and atraumatic. Right Ear: External ear normal.      Left Ear: External ear normal.      Mouth/Throat:      Pharynx: No oropharyngeal exudate. Eyes:      General: No scleral icterus. Right eye: No discharge. Left eye: No discharge. Conjunctiva/sclera: Conjunctivae normal.   Cardiovascular:      Rate and Rhythm: Normal rate and regular rhythm. Heart sounds: Normal heart sounds. No murmur heard. No friction rub. No gallop.     Pulmonary: Effort: Pulmonary effort is normal. No respiratory distress. Breath sounds: Normal breath sounds. No wheezing or rales. Abdominal:      General: Bowel sounds are normal. There is no distension. Palpations: Abdomen is soft. There is no mass. Tenderness: There is no abdominal tenderness. There is no guarding or rebound. Musculoskeletal:         General: No swelling (BUE) or tenderness (BUE). Cervical back: Neck supple. Lymphadenopathy:      Cervical: No cervical adenopathy. Skin:     General: Skin is warm and dry. Findings: No erythema. Neurological:      Mental Status: She is alert and oriented to person, place, and time. Motor: No abnormal muscle tone. Gait: Gait is intact. Gait normal.   Psychiatric:         Mood and Affect: Mood and affect normal.         DIAGNOSTICS:   Lab Results   Component Value Date/Time    Sodium 141 05/20/2021 10:30 AM    Potassium 4.5 05/20/2021 10:30 AM    Chloride 103 05/20/2021 10:30 AM    CO2 26 05/20/2021 10:30 AM    Glucose 84 05/20/2021 10:30 AM    BUN 8 05/20/2021 10:30 AM    Creatinine 0.81 05/20/2021 10:30 AM    BUN/Creatinine ratio 10 05/20/2021 10:30 AM    GFR est AA 99 05/20/2021 10:30 AM    GFR est non-AA 86 05/20/2021 10:30 AM    Calcium 9.4 05/20/2021 10:30 AM    Bilirubin, total 0.3 05/20/2021 10:30 AM    Alk. phosphatase 49 05/20/2021 10:30 AM    Protein, total 6.5 05/20/2021 10:30 AM    Albumin 4.1 05/20/2021 10:30 AM    A-G Ratio 1.7 05/20/2021 10:30 AM    ALT (SGPT) 9 05/20/2021 10:30 AM    AST (SGOT) 18 05/20/2021 10:30 AM     Lab Results   Component Value Date/Time    WBC 3.7 05/20/2021 10:30 AM    HGB 13.6 05/20/2021 10:30 AM    HCT 40.4 05/20/2021 10:30 AM    PLATELET 383 20/21/6149 10:30 AM    MCV 91 05/20/2021 10:30 AM     Assessment/Plan:   1.  Health Maintenance:  - Referral to GI for colon cancer screening.  - Checking a lipid panel to screen for HLD    ORDERS:  - REFERRAL TO GASTROENTEROLOGY  - LIPID PANEL; Future    2. Fatigue: +H/o anemia. - Checking labs. ORDERS:  - CBC WITH AUTOMATED DIFF; Future  - METABOLIC PANEL, COMPREHENSIVE; Future  - TSH AND FREE T4; Future    3. Anxiety: Stable. - C/w rx as prescribed. 4. Blurry Vision: From cataracts  Preoperative Assessment: No contraindications to planned surgery   Orders/studies that need to be obtained prior to surgical clearance: medical clearance has been obtained    Pt is to undergo a low risk procedure with a low cardiac risk based on current history. Labs and imaging within acceptable range. No contraindications to planned surgery. Discontinue NSAIDS 1 week prior to surgical procedure. Follow up as scheduled post operatively. I have discussed the plan of care with the patient. The patient has received an after-visit summary and questions were answered concerning future plans. I have discussed medication side effects and warnings with the patient as well. All questions were answered. The patient understands the plan of care. Handouts provided today with the above information. Pt instructed if symptoms worsen to call the office or report to the ED for continued care. Greater than 50% of the visit time was spent in counseling and/or coordination of care.       **She is allergic to latex as mentioned above**    Dr. Frazier Doing  Internists of [address]  Phone: [phone]  Fax: [phone]

## 2021-05-14 NOTE — PROGRESS NOTES
Jenelle Holcomb presents today for   Chief Complaint   Patient presents with    Pre-op Exam     scheduled for Right eye cataract surgery on 6-7-21 W/ ([facility]). Fax preop to [phone]          Coordination of Care:  1. Have you been to the ER, urgent care clinic since your last visit? Hospitalized since your last visit? no    2. Have you seen or consulted any other health care providers outside of the [address] [place] [address] since your last visit? Include any pap smears or colon screening.  yes

## 2021-05-20 ENCOUNTER — APPOINTMENT (OUTPATIENT)
Dept: INTERNAL MEDICINE CLINIC | Age: 51
End: 2021-05-20

## 2021-05-20 DIAGNOSIS — D64.9 ANEMIA, UNSPECIFIED TYPE: ICD-10-CM

## 2021-05-20 DIAGNOSIS — Z13.220 SCREENING FOR HYPERLIPIDEMIA: ICD-10-CM

## 2021-05-20 DIAGNOSIS — R53.83 FATIGUE, UNSPECIFIED TYPE: ICD-10-CM

## 2021-05-21 LAB
ALBUMIN SERPL-MCNC: 4.1 G/DL (ref 3.8–4.8)
ALBUMIN/GLOB SERPL: 1.7 {RATIO} (ref 1.2–2.2)
ALP SERPL-CCNC: 49 IU/L (ref 48–121)
ALT SERPL-CCNC: 9 IU/L (ref 0–32)
AST SERPL-CCNC: 18 IU/L (ref 0–40)
BASOPHILS # BLD AUTO: 0.1 X10E3/UL (ref 0–0.2)
BASOPHILS NFR BLD AUTO: 2 %
BILIRUB SERPL-MCNC: 0.3 MG/DL (ref 0–1.2)
BUN SERPL-MCNC: 8 MG/DL (ref 6–24)
BUN/CREAT SERPL: 10 (ref 9–23)
CALCIUM SERPL-MCNC: 9.4 MG/DL (ref 8.7–10.2)
CHLORIDE SERPL-SCNC: 103 MMOL/L (ref 96–106)
CHOLEST SERPL-MCNC: 188 MG/DL (ref 100–199)
CO2 SERPL-SCNC: 26 MMOL/L (ref 20–29)
CREAT SERPL-MCNC: 0.81 MG/DL (ref 0.57–1)
EOSINOPHIL # BLD AUTO: 0.2 X10E3/UL (ref 0–0.4)
EOSINOPHIL NFR BLD AUTO: 5 %
ERYTHROCYTE [DISTWIDTH] IN BLOOD BY AUTOMATED COUNT: 12.9 % (ref 11.7–15.4)
FERRITIN SERPL-MCNC: 43 NG/ML (ref 15–150)
GLOBULIN SER CALC-MCNC: 2.4 G/DL (ref 1.5–4.5)
GLUCOSE SERPL-MCNC: 84 MG/DL (ref 65–99)
HCT VFR BLD AUTO: 40.4 % (ref 34–46.6)
HDLC SERPL-MCNC: 63 MG/DL
HGB BLD-MCNC: 13.6 G/DL (ref 11.1–15.9)
IMM GRANULOCYTES # BLD AUTO: 0 X10E3/UL (ref 0–0.1)
IMM GRANULOCYTES NFR BLD AUTO: 0 %
IMP & REVIEW OF LAB RESULTS: NORMAL
IRON SATN MFR SERPL: 28 % (ref 15–55)
IRON SERPL-MCNC: 74 UG/DL (ref 27–159)
LDLC SERPL CALC-MCNC: 116 MG/DL (ref 0–99)
LYMPHOCYTES # BLD AUTO: 0.9 X10E3/UL (ref 0.7–3.1)
LYMPHOCYTES NFR BLD AUTO: 23 %
MCH RBC QN AUTO: 30.5 PG (ref 26.6–33)
MCHC RBC AUTO-ENTMCNC: 33.7 G/DL (ref 31.5–35.7)
MCV RBC AUTO: 91 FL (ref 79–97)
MONOCYTES # BLD AUTO: 0.5 X10E3/UL (ref 0.1–0.9)
MONOCYTES NFR BLD AUTO: 13 %
NEUTROPHILS # BLD AUTO: 2.1 X10E3/UL (ref 1.4–7)
NEUTROPHILS NFR BLD AUTO: 57 %
PLATELET # BLD AUTO: 325 X10E3/UL (ref 150–450)
POTASSIUM SERPL-SCNC: 4.5 MMOL/L (ref 3.5–5.2)
PROT SERPL-MCNC: 6.5 G/DL (ref 6–8.5)
RBC # BLD AUTO: 4.46 X10E6/UL (ref 3.77–5.28)
SODIUM SERPL-SCNC: 141 MMOL/L (ref 134–144)
T4 FREE SERPL-MCNC: 1.05 NG/DL (ref 0.82–1.77)
TIBC SERPL-MCNC: 266 UG/DL (ref 250–450)
TRIGL SERPL-MCNC: 49 MG/DL (ref 0–149)
TSH SERPL DL<=0.005 MIU/L-ACNC: 1.05 UIU/ML (ref 0.45–4.5)
UIBC SERPL-MCNC: 192 UG/DL (ref 131–425)
VLDLC SERPL CALC-MCNC: 9 MG/DL (ref 5–40)
WBC # BLD AUTO: 3.7 X10E3/UL (ref 3.4–10.8)

## 2021-06-05 PROBLEM — H25.011 CORTICAL AGE-RELATED CATARACT OF RIGHT EYE: Status: ACTIVE | Noted: 2021-06-05

## 2021-06-07 ENCOUNTER — TELEPHONE (OUTPATIENT)
Dept: INTERNAL MEDICINE CLINIC | Age: 51
End: 2021-06-07

## 2021-06-07 PROBLEM — H25.011 CORTICAL AGE-RELATED CATARACT OF RIGHT EYE: Status: RESOLVED | Noted: 2021-06-05 | Resolved: 2021-06-07

## 2021-06-07 NOTE — TELEPHONE ENCOUNTER
----- Message from Ny Hamilton MD sent at 6/7/2021  9:01 AM EDT -----  Please let her know that her labs are reassuring. She needs to work on her cholesterol. Her LDL is 116 and about the same as it was 3 years ago.   The rest of her cholesterol measurements are normal.    Dr. David Espinoza  [facility] of [facility], [address] [place] [address], [address] [place] [address] Str.  Phone: [phone]  Fax: [phone]

## 2021-06-07 NOTE — PROGRESS NOTES
Please let her know that her labs are reassuring. She needs to work on her cholesterol. Her LDL is 116 and about the same as it was 3 years ago.   The rest of her cholesterol measurements are normal.    Dr. Nicci Blanco  [facility] of [facility], [address] [place] [address], [address] [place] [address] Str.  Phone: [phone]  Fax: [phone]

## 2021-06-19 PROBLEM — H25.012 CORTICAL AGE-RELATED CATARACT OF LEFT EYE: Status: ACTIVE | Noted: 2021-06-19

## 2021-06-21 PROBLEM — H25.012 CORTICAL AGE-RELATED CATARACT OF LEFT EYE: Status: RESOLVED | Noted: 2021-06-19 | Resolved: 2021-06-21

## 2021-10-07 DIAGNOSIS — F41.9 ANXIETY: ICD-10-CM

## 2021-10-07 RX ORDER — FLUOXETINE HYDROCHLORIDE 40 MG/1
CAPSULE ORAL
Qty: 90 CAPSULE | Refills: 1 | Status: SHIPPED | OUTPATIENT
Start: 2021-10-07 | End: 2022-05-25 | Stop reason: SDUPTHER

## 2021-11-01 DIAGNOSIS — K21.9 GASTROESOPHAGEAL REFLUX DISEASE, UNSPECIFIED WHETHER ESOPHAGITIS PRESENT: ICD-10-CM

## 2021-11-01 RX ORDER — RABEPRAZOLE SODIUM 20 MG/1
TABLET, DELAYED RELEASE ORAL
Qty: 90 TABLET | Refills: 3 | Status: SHIPPED | OUTPATIENT
Start: 2021-11-01 | End: 2022-05-25 | Stop reason: SDUPTHER

## 2022-02-08 ENCOUNTER — TRANSCRIBE ORDER (OUTPATIENT)
Dept: SCHEDULING | Age: 52
End: 2022-02-08

## 2022-02-08 DIAGNOSIS — Z12.31 VISIT FOR SCREENING MAMMOGRAM: Primary | ICD-10-CM

## 2022-03-15 ENCOUNTER — HOSPITAL ENCOUNTER (OUTPATIENT)
Dept: MAMMOGRAPHY | Age: 52
Discharge: HOME | End: 2022-03-15
Attending: INTERNAL MEDICINE
Payer: COMMERCIAL

## 2022-03-15 DIAGNOSIS — Z12.31 VISIT FOR SCREENING MAMMOGRAM: ICD-10-CM

## 2022-03-15 PROCEDURE — 77063 BREAST TOMOSYNTHESIS BI: CPT

## 2022-03-19 PROBLEM — F41.9 ANXIETY: Status: ACTIVE | Noted: 2018-02-02

## 2022-03-19 PROBLEM — K21.9 GERD (GASTROESOPHAGEAL REFLUX DISEASE): Status: ACTIVE | Noted: 2018-01-17

## 2022-03-19 PROBLEM — G47.00 INSOMNIA: Status: ACTIVE | Noted: 2018-02-02

## 2022-03-20 PROBLEM — J30.9 ALLERGIC RHINITIS: Status: ACTIVE | Noted: 2018-02-02

## 2022-04-20 ENCOUNTER — TELEPHONE (OUTPATIENT)
Dept: INTERNAL MEDICINE CLINIC | Age: 52
End: 2022-04-20

## 2022-04-20 DIAGNOSIS — N63.10 MASS OF RIGHT BREAST, UNSPECIFIED QUADRANT: ICD-10-CM

## 2022-04-20 DIAGNOSIS — R92.8 ABNORMAL MAMMOGRAM OF RIGHT BREAST: Primary | ICD-10-CM

## 2022-04-20 DIAGNOSIS — R92.8 ABNORMAL MAMMOGRAM OF RIGHT BREAST: ICD-10-CM

## 2022-04-20 NOTE — TELEPHONE ENCOUNTER
----- Message from Diane Goins sent at 4/20/2022 12:27 PM EDT -----  Subject: Referral Request    QUESTIONS   Reason for referral request? Patient is calling to see if orders can be   placed for additional views for the abnormal mammogram can be placed she   received letter back March and the mammograms' that was completed back in   march as well. Has the physician seen you for this condition before? Yes  Select a date? 2022-02-08  Select the Provider the patient wants to be referred to, if known (PCP or   Specialist)? Indy Jung   Preferred Specialist (if applicable)? Do you already have an appointment scheduled? No  Additional Information for Provider?   ---------------------------------------------------------------------------  --------------  CALL BACK INFO  What is the best way for the office to contact you? OK to leave message on   voicemail  Preferred Call Back Phone Number? [phone]  ---------------------------------------------------------------------------  --------------  SCRIPT ANSWERS  Relationship to Patient?  Self

## 2022-04-20 NOTE — TELEPHONE ENCOUNTER
Please let her know that I ordered the additional views. Please have her scheduled for an in office appointment for a regular checkup over the next 1 to 2 months.     Dr. Chris Mathis  Internists of [address], [address] [place] [address] Str.  Phone: [phone]  Fax: [phone]

## 2022-05-11 ENCOUNTER — HOSPITAL ENCOUNTER (OUTPATIENT)
Dept: MAMMOGRAPHY | Age: 52
Discharge: HOME | End: 2022-05-11
Attending: INTERNAL MEDICINE
Payer: COMMERCIAL

## 2022-05-11 ENCOUNTER — HOSPITAL ENCOUNTER (OUTPATIENT)
Dept: ULTRASOUND IMAGING | Age: 52
Discharge: HOME | End: 2022-05-11
Attending: INTERNAL MEDICINE
Payer: COMMERCIAL

## 2022-05-11 DIAGNOSIS — R92.8 ABNORMAL MAMMOGRAM OF RIGHT BREAST: ICD-10-CM

## 2022-05-11 DIAGNOSIS — N63.10 MASS OF RIGHT BREAST, UNSPECIFIED QUADRANT: ICD-10-CM

## 2022-05-11 PROCEDURE — 76642 ULTRASOUND BREAST LIMITED: CPT

## 2022-05-11 PROCEDURE — 77061 BREAST TOMOSYNTHESIS UNI: CPT

## 2022-05-25 ENCOUNTER — OFFICE VISIT (OUTPATIENT)
Dept: INTERNAL MEDICINE CLINIC | Age: 52
End: 2022-05-25
Payer: COMMERCIAL

## 2022-05-25 VITALS
WEIGHT: 135 LBS | HEART RATE: 65 BPM | SYSTOLIC BLOOD PRESSURE: 99 MMHG | HEIGHT: 61 IN | OXYGEN SATURATION: 97 % | RESPIRATION RATE: 18 BRPM | DIASTOLIC BLOOD PRESSURE: 66 MMHG | TEMPERATURE: 98 F | BODY MASS INDEX: 25.49 KG/M2

## 2022-05-25 DIAGNOSIS — Z12.11 SCREENING FOR COLON CANCER: ICD-10-CM

## 2022-05-25 DIAGNOSIS — Z00.00 ROUTINE GENERAL MEDICAL EXAMINATION AT A HEALTH CARE FACILITY: Primary | ICD-10-CM

## 2022-05-25 DIAGNOSIS — R92.8 ABNORMAL MAMMOGRAM OF RIGHT BREAST: ICD-10-CM

## 2022-05-25 DIAGNOSIS — F41.9 ANXIETY: ICD-10-CM

## 2022-05-25 DIAGNOSIS — E78.5 HYPERLIPIDEMIA, UNSPECIFIED HYPERLIPIDEMIA TYPE: ICD-10-CM

## 2022-05-25 DIAGNOSIS — K21.9 GASTROESOPHAGEAL REFLUX DISEASE, UNSPECIFIED WHETHER ESOPHAGITIS PRESENT: ICD-10-CM

## 2022-05-25 PROCEDURE — 99396 PREV VISIT EST AGE 40-64: CPT | Performed by: INTERNAL MEDICINE

## 2022-05-25 RX ORDER — BUTALBITAL, ACETAMINOPHEN AND CAFFEINE 50; 325; 40 MG/1; MG/1; MG/1
TABLET ORAL
COMMUNITY
Start: 2022-02-24

## 2022-05-25 NOTE — PROGRESS NOTES
Nola Johnson presents today for   Chief Complaint   Patient presents with    Complete Physical       1. \"Have you been to the ER, urgent care clinic since your last visit? Hospitalized since your last visit? \" NO    2. \"Have you seen or consulted any other health care providers outside of the [address] [place] [address] since your last visit? \" YES     3. For patients aged 39-70: Has the patient had a colonoscopy / FIT/ Cologuard? No      If the patient is female:    4. For patients aged 41-77: Has the patient had a mammogram within the past 2 years? NA - based on age or sex  See top three    5. For patients aged 21-65: Has the patient had a pap smear?  NA - based on age or sex

## 2022-05-25 NOTE — PROGRESS NOTES
[facility] [place] 2]:  5/25/2022, MRN: [id number]      Guy Bravo is a 49 y.o. female and presents to clinic for a Complete Physical      Subjective: The patient is a 45-year-old female with history of allergic rhinitis, IBS, insomnia, trigeminal neuralgia (since her last appointment, the patient per ENT team), and GERD. Health Maintenance:  - S/p2 pfizer shots around [place]   - She is taking some natural supplements (psyllium husk). No constipation.   - S/p cataract surgery; vision is unchanged. - She is scheduling to see Maria Isabel Schmid with GI for colon cancer screening  - No tobacco/ETOH/drug use  - She continues to care for her horses. -Her last LDL was in the 160 range last year. She is not on a cholesterol-lowering medication. 2. Stress: \"I'm not depressed. \" She believes she has menopause sx. +Hot flashes. +Decreased libido. Taking Prozac. She needs a refill. 3. Abnormal Mammogram:  No breast tenderness. No breast pain. Status post abnormal mammogram earlier this month. Findings are listed below. 5/11/22 Mammogram: Probably benign complicated cyst right breast 1:00 position 5 cm from the nipple. BIRADS CATEGORY: BIRADS 3: Probably benign finding. Management Recommendation: Close interval follow-up with repeat right mammogram and ultrasound in 6 months.  The findings and recommendations were discussed with the patient at the time of the exam.      Patient Active Problem List    Diagnosis Date Noted    Insomnia 02/02/2018    Anxiety 02/02/2018    Allergic rhinitis 02/02/2018    GERD (gastroesophageal reflux disease) 01/17/2018       Current Outpatient Medications   Medication Sig Dispense Refill    butalbital-acetaminophen-caffeine (FIORICET, ESGIC) -40 mg per tablet       RABEprazole (ACIPHEX) 20 mg TbEC TAKE 1 TABLET BY MOUTH EVERY DAY 90 Tablet 3    FLUoxetine (PROzac) 40 mg capsule TAKE 1 CAPSULE BY MOUTH EVERY DAY 90 Capsule 1    Cetirizine (ZYRTEC) 10 mg cap Take 10 mg by mouth daily.  prednisoLONE acetate (PRED FORTE) 1 % ophthalmic suspension  (Patient not taking: Reported on 2022)      moxifloxacin (VIGAMOX) 0.5 % ophthalmic solution  (Patient not taking: Reported on 2022)      ketorolac (ACULAR) 0.5 % ophthalmic solution  (Patient not taking: Reported on 2022)         Allergies   Allergen Reactions    Latex Hives    Latex, Natural Rubber Rash and Itching     Denies LATEX allergy -- states condoms caused itching and irritation  Denies LATEX allergy -- states condoms caused itching and irritation    Other Food Other (comments)     MSG Severe Headache    Morphine Nausea and Vomiting, Nausea Only and Other (comments)     Severe N/V  Severe N/V      Aleve [Naproxen Sodium] Nausea and Vomiting    Cat Dander Other (comments)     Causes asthma    Motrin [Ibuprofen] Nausea and Vomiting    Naproxen Nausea and Vomiting       Past Medical History:   Diagnosis Date    Cortical age-related cataract of left eye 2021    Cortical age-related cataract of right eye 2021    GERD (gastroesophageal reflux disease)     IBS (irritable bowel syndrome)     Migraine     Nausea & vomiting        Past Surgical History:   Procedure Laterality Date    HX BREAST LUMPECTOMY Left     Benign    HX BREAST RECONSTRUCTION      HX CATARACT REMOVAL Right 2021    HX HEENT      jaw joint replacement left    HX HYSTERECTOMY      IMPLANT BREAST SILICONE/EQ         Family History   Problem Relation Age of Onset    Stroke Mother     Cancer Father     Diabetes Maternal Grandmother        Social History     Tobacco Use    Smoking status: Former Smoker     Types: Cigarettes     Quit date: 2001     Years since quittin.9    Smokeless tobacco: Never Used    Tobacco comment: social smoker in the past   Substance Use Topics    Alcohol use: Yes     Comment: occasionallyy       ROS   Review of Systems   Constitutional: Negative for chills and fever. HENT: Negative for ear pain and sore throat. Eyes: Negative for blurred vision and pain. Respiratory: Negative for cough and shortness of breath. Cardiovascular: Negative for chest pain. Gastrointestinal: Negative for abdominal pain, blood in stool and melena. Genitourinary: Negative for dysuria and hematuria. Musculoskeletal: Negative for joint pain and myalgias. Skin: Negative for rash. Neurological: Negative for headaches. Endo/Heme/Allergies: Does not bruise/bleed easily. Psychiatric/Behavioral: Negative for substance abuse. Objective     Vitals:    05/25/22 1334   Resp: 18   Temp: 98 °F (36.7 °C)   TempSrc: Temporal   Weight: 135 lb (61.2 kg)   Height: 5' 1\" (1.549 m)   PainSc:   0 - No pain       Physical Exam  Vitals and nursing note reviewed. HENT:      Head: Normocephalic and atraumatic. Right Ear: Tympanic membrane and external ear normal.      Left Ear: Tympanic membrane and external ear normal.   Eyes:      General: No scleral icterus. Right eye: No discharge. Left eye: No discharge. Conjunctiva/sclera: Conjunctivae normal.   Cardiovascular:      Rate and Rhythm: Normal rate and regular rhythm. Heart sounds: Normal heart sounds. No murmur heard. No friction rub. No gallop. Pulmonary:      Effort: Pulmonary effort is normal. No respiratory distress. Breath sounds: Normal breath sounds. No wheezing or rales. Abdominal:      General: Bowel sounds are normal. There is no distension. Palpations: Abdomen is soft. There is no mass. Tenderness: There is no abdominal tenderness. There is no guarding or rebound. Musculoskeletal:         General: No swelling (BUE) or tenderness (BUE). Cervical back: Neck supple. Comments: BLE are NTTP. No effusions. She has minor crepitus with flexion extension along bilateral knee joints. She has adequate range of motion along bilateral upper extremities and lateral lower extremities. Her back exam is unremarkable. Lymphadenopathy:      Cervical: No cervical adenopathy. Skin:     General: Skin is warm and dry. Findings: No erythema or rash. Neurological:      Mental Status: She is alert. Motor: No abnormal muscle tone. Gait: Gait normal.   Psychiatric:         Mood and Affect: Mood normal.         LABS   Data Review:   Lab Results   Component Value Date/Time    WBC 3.7 05/20/2021 10:30 AM    HGB 13.6 05/20/2021 10:30 AM    HCT 40.4 05/20/2021 10:30 AM    PLATELET 746 15/95/8335 10:30 AM    MCV 91 05/20/2021 10:30 AM       Lab Results   Component Value Date/Time    Sodium 141 05/20/2021 10:30 AM    Potassium 4.5 05/20/2021 10:30 AM    Chloride 103 05/20/2021 10:30 AM    CO2 26 05/20/2021 10:30 AM    Glucose 84 05/20/2021 10:30 AM    BUN 8 05/20/2021 10:30 AM    Creatinine 0.81 05/20/2021 10:30 AM    BUN/Creatinine ratio 10 05/20/2021 10:30 AM    GFR est AA 99 05/20/2021 10:30 AM    GFR est non-AA 86 05/20/2021 10:30 AM    Calcium 9.4 05/20/2021 10:30 AM       Lab Results   Component Value Date/Time    Cholesterol, total 188 05/20/2021 10:30 AM    HDL Cholesterol 63 05/20/2021 10:30 AM    LDL, calculated 116 (H) 05/20/2021 10:30 AM    LDL, calculated 114.2 (H) 03/21/2018 08:50 AM    VLDL, calculated 9 05/20/2021 10:30 AM    VLDL, calculated 9.8 03/21/2018 08:50 AM    Triglyceride 49 05/20/2021 10:30 AM    CHOL/HDL Ratio 3.1 03/21/2018 08:50 AM       No results found for: HBA1C, FHY1QRQD, ZWL2HJYO    Assessment/Plan:   1. Abnormal mammogram of right breast:   -Ordering a follow-up mammogram and ultrasound later this year. ORDERS:  - SYDNEY MAMMO RT DX INCL CAD; Future  - US BREAST RT LIMITED=<3 QUAD; Future    2. Health Maintenance:  -I encouraged her to follow-up with GI for colon cancer screening.  - I encouraged her to get all recommended vaccinations.  -Refilling her Aciphex. I will check a CBC in a year. ORDERS:  - RABEprazole (ACIPHEX) 20 mg TbEC;  Take 1 Tablet by mouth daily. Dispense: 90 Tablet; Refill: 3    3. Hyperlipidemia:   -Checking labs now and in a year. ORDERS:  - METABOLIC PANEL, COMPREHENSIVE; Future  - LIPID PANEL; Future    4. Anxiety: Stable. -Continue with Prozac which I am refilling today. ORDERS:  - FLUoxetine (PROzac) 40 mg capsule; Take 1 Capsule by mouth daily. Dispense: 90 Capsule; Refill: 1        Health Maintenance Due   Topic Date Due    Hepatitis C Screening  Never done    COVID-19 Vaccine (1) Never done    Colorectal Cancer Screening Combo  Never done    Shingrix Vaccine Age 50> (1 of 2) Never done    Depression Screen  05/14/2022     Lab review: labs are reviewed in the EHR and ordered as mentioned above. I have discussed the diagnosis with the patient and the intended plan as seen in the above orders. The patient has received an after-visit summary and questions were answered concerning future plans. I have discussed medication side effects and warnings with the patient as well. I have reviewed the plan of care with the patient, accepted their input and they are in agreement with the treatment goals. All questions were answered. The patient understands the plan of care. Handouts provided today with above information. Pt instructed if symptoms worsen to call the office or report to the ED for continued care. Greater than 50% of the visit time was spent in counseling and/or coordination of care. Voice recognition was used to generate this report, which may have resulted in some phonetic based errors in grammar and contents. Even though attempts were made to correct all the mistakes, some may have been missed, and remained in the body of the document.           Chanell Mcqueen MD

## 2022-05-25 NOTE — PATIENT INSTRUCTIONS
Body Mass Index: Care Instructions  Your Care Instructions     Body mass index (BMI) can help you see if your weight is raising your risk for health problems. It uses a formula to compare how much you weigh with how tall you are. · A BMI lower than 18.5 is considered underweight. · A BMI between 18.5 and 24.9 is considered healthy. · A BMI between 25 and 29.9 is considered overweight. A BMI of 30 or higher is considered obese. If your BMI is in the normal range, it means that you have a lower risk for weight-related health problems. If your BMI is in the overweight or obese range, you may be at increased risk for weight-related health problems, such as high blood pressure, heart disease, stroke, arthritis or joint pain, and diabetes. If your BMI is in the underweight range, you may be at increased risk for health problems such as fatigue, lower protection (immunity) against illness, muscle loss, bone loss, hair loss, and hormone problems. BMI is just one measure of your risk for weight-related health problems. You may be at higher risk for health problems if you are not active, you eat an unhealthy diet, or you drink too much alcohol or use tobacco products. Follow-up care is a key part of your treatment and safety. Be sure to make and go to all appointments, and call your doctor if you are having problems. It's also a good idea to know your test results and keep a list of the medicines you take. How can you care for yourself at home? · Practice healthy eating habits. This includes eating plenty of fruits, vegetables, whole grains, lean protein, and low-fat dairy. · If your doctor recommends it, get more exercise. Walking is a good choice. Bit by bit, increase the amount you walk every day. Try for at least 30 minutes on most days of the week. · Do not smoke. Smoking can increase your risk for health problems. If you need help quitting, talk to your doctor about stop-smoking programs and medicines. These can increase your chances of quitting for good. · Limit alcohol to 2 drinks a day for men and 1 drink a day for women. Too much alcohol can cause health problems. If you have a BMI higher than 25  · Your doctor may do other tests to check your risk for weight-related health problems. This may include measuring the distance around your waist. A waist measurement of more than 40 inches in men or 35 inches in women can increase the risk of weight-related health problems. · Talk with your doctor about steps you can take to stay healthy or improve your health. You may need to make lifestyle changes to lose weight and stay healthy, such as changing your diet and getting regular exercise. If you have a BMI lower than 18.5  · Your doctor may do other tests to check your risk for health problems. · Talk with your doctor about steps you can take to stay healthy or improve your health. You may need to make lifestyle changes to gain or maintain weight and stay healthy, such as getting more healthy foods in your diet and doing exercises to build muscle. Where can you learn more? Go to http://www.lala.com/  Enter S176 in the search box to learn more about \"Body Mass Index: Care Instructions. \"  Current as of: December 27, 2021               Content Version: 13.2  © 2006-2022 Healthwise, Inc[employer]. Care instructions adapted under license by [employer] (which disclaims liability or warranty for this information). If you have questions about a medical condition or this instruction, always ask your healthcare professional. [place] [address] any warranty or liability for your use of this information.

## 2022-05-31 DIAGNOSIS — F41.9 ANXIETY: ICD-10-CM

## 2022-05-31 RX ORDER — FLUOXETINE HYDROCHLORIDE 40 MG/1
40 CAPSULE ORAL DAILY
Qty: 90 CAPSULE | Refills: 1 | Status: SHIPPED | OUTPATIENT
Start: 2022-05-31 | End: 2022-11-02

## 2022-05-31 RX ORDER — RABEPRAZOLE SODIUM 20 MG/1
20 TABLET, DELAYED RELEASE ORAL DAILY
Qty: 90 TABLET | Refills: 3 | Status: SHIPPED | OUTPATIENT
Start: 2022-05-31

## 2022-05-31 RX ORDER — FLUOXETINE HYDROCHLORIDE 40 MG/1
CAPSULE ORAL
Qty: 90 CAPSULE | Refills: 1 | Status: SHIPPED | OUTPATIENT
Start: 2022-05-31

## 2022-08-23 ENCOUNTER — TELEPHONE (OUTPATIENT)
Dept: INTERNAL MEDICINE CLINIC | Age: 52
End: 2022-08-23

## 2022-08-23 DIAGNOSIS — Z12.11 COLON CANCER SCREENING: Primary | ICD-10-CM

## 2022-08-23 NOTE — TELEPHONE ENCOUNTER
Pt calling re: several items:    Stated received 2 letters from Cl re: Dr Ash Gamboa is no longer designated for family medicine. The second letter the same it said no longer designated as a specialists for internal medicine. Yunior Self please note)    2. Pt asking for a referral for mammogram. Stated she had an issue with breast and they wanted to do another mammogram in 6 months. She was advised to schedule in Sept 2022 for an appt 3 months later. 3. Asking for an updated referral to colonoscopy. Stated she never saw the provider from the referral from last year.  She now wants to have it done

## 2022-08-25 NOTE — TELEPHONE ENCOUNTER
Patient reached and given central scheduling number to setup mammogram appointment. Patient also would like her referral to be faxed to [facility] again, as she wasn't able to set up an appointment previously. Patient aware that the manager has sent out a message to Addis Sun to inquire about her letter received. Patient will try to send letter received via [employer].

## 2022-11-01 DIAGNOSIS — F41.9 ANXIETY: ICD-10-CM

## 2022-11-02 RX ORDER — FLUOXETINE HYDROCHLORIDE 40 MG/1
CAPSULE ORAL
Qty: 90 CAPSULE | Refills: 1 | Status: SHIPPED | OUTPATIENT
Start: 2022-11-02

## 2022-11-14 ENCOUNTER — HOSPITAL ENCOUNTER (OUTPATIENT)
Dept: MAMMOGRAPHY | Age: 52
Discharge: HOME | End: 2022-11-14
Attending: INTERNAL MEDICINE
Payer: COMMERCIAL

## 2022-11-14 ENCOUNTER — HOSPITAL ENCOUNTER (OUTPATIENT)
Dept: ULTRASOUND IMAGING | Age: 52
Discharge: HOME | End: 2022-11-14
Attending: INTERNAL MEDICINE
Payer: COMMERCIAL

## 2022-11-14 DIAGNOSIS — R92.8 ABNORMAL MAMMOGRAM OF RIGHT BREAST: ICD-10-CM

## 2022-11-14 PROCEDURE — 76642 ULTRASOUND BREAST LIMITED: CPT

## 2022-11-14 PROCEDURE — 77061 BREAST TOMOSYNTHESIS UNI: CPT

## 2023-02-03 DIAGNOSIS — E78.5 HYPERLIPIDEMIA, UNSPECIFIED HYPERLIPIDEMIA TYPE: Primary | ICD-10-CM

## 2023-02-05 DIAGNOSIS — E78.5 HYPERLIPIDEMIA, UNSPECIFIED HYPERLIPIDEMIA TYPE: Primary | ICD-10-CM

## 2023-02-05 DIAGNOSIS — K21.9 GASTROESOPHAGEAL REFLUX DISEASE, UNSPECIFIED WHETHER ESOPHAGITIS PRESENT: Primary | ICD-10-CM

## 2023-03-01 ENCOUNTER — TRANSCRIBE ORDERS (OUTPATIENT)
Facility: HOSPITAL | Age: 53
End: 2023-03-01

## 2023-03-01 DIAGNOSIS — Z12.31 VISIT FOR SCREENING MAMMOGRAM: Primary | ICD-10-CM

## 2023-03-20 DIAGNOSIS — F41.9 ANXIETY DISORDER, UNSPECIFIED: ICD-10-CM

## 2023-03-20 RX ORDER — FLUOXETINE HYDROCHLORIDE 40 MG/1
CAPSULE ORAL
Qty: 30 CAPSULE | Refills: 5 | Status: SHIPPED | OUTPATIENT
Start: 2023-03-20 | End: 2023-03-21

## 2023-03-20 RX ORDER — RABEPRAZOLE SODIUM 20 MG/1
20 TABLET, DELAYED RELEASE ORAL DAILY
Qty: 30 TABLET | Refills: 5 | Status: SHIPPED | OUTPATIENT
Start: 2023-03-20

## 2023-03-20 NOTE — TELEPHONE ENCOUNTER
----- Message from Erica Flaquita sent at 3/20/2023 12:01 PM EDT -----  Subject: Refill Request    QUESTIONS  Name of Medication? FLUoxetine (PROZAC) 40 MG capsule  Patient-reported dosage and instructions? once a day  How many days do you have left? 5  Preferred Pharmacy? [id number]  Pharmacy phone number (if available)? [phone]  ---------------------------------------------------------------------------  --------------,  Name of Medication? RABEprazole (ACIPHEX) 20 MG tablet  Patient-reported dosage and instructions? 20mg once a day  How many days do you have left? 5  Preferred Pharmacy? [id number]  Pharmacy phone number (if available)? [phone]  ---------------------------------------------------------------------------  --------------  CALL BACK INFO  What is the best way for the office to contact you? OK to leave message on   voicemail  Preferred Call Back Phone Number? [phone]  ---------------------------------------------------------------------------  --------------  SCRIPT ANSWERS  Relationship to Patient?  Self

## 2023-03-21 RX ORDER — FLUOXETINE HYDROCHLORIDE 40 MG/1
CAPSULE ORAL
Qty: 90 CAPSULE | Refills: 1 | Status: SHIPPED | OUTPATIENT
Start: 2023-03-21

## 2023-03-27 ENCOUNTER — HOSPITAL ENCOUNTER (OUTPATIENT)
Facility: HOSPITAL | Age: 53
Discharge: HOME | End: 2023-03-30
Payer: COMMERCIAL

## 2023-03-27 DIAGNOSIS — Z12.31 VISIT FOR SCREENING MAMMOGRAM: ICD-10-CM

## 2023-03-27 PROCEDURE — 77063 BREAST TOMOSYNTHESIS BI: CPT

## 2023-06-02 LAB
BASOPHILS # BLD AUTO: 0.1 X10E3/UL (ref 0–0.2)
BASOPHILS NFR BLD AUTO: 1 %
EOSINOPHIL # BLD AUTO: 0.2 X10E3/UL (ref 0–0.4)
EOSINOPHIL NFR BLD AUTO: 3 %
ERYTHROCYTE [DISTWIDTH] IN BLOOD BY AUTOMATED COUNT: 12.6 % (ref 11.7–15.4)
HCT VFR BLD AUTO: 38.7 % (ref 34–46.6)
HGB BLD-MCNC: 13.1 G/DL (ref 11.1–15.9)
IMM GRANULOCYTES # BLD AUTO: 0 X10E3/UL (ref 0–0.1)
IMM GRANULOCYTES NFR BLD AUTO: 0 %
LYMPHOCYTES # BLD AUTO: 1.4 X10E3/UL (ref 0.7–3.1)
LYMPHOCYTES NFR BLD AUTO: 23 %
MCH RBC QN AUTO: 29.8 PG (ref 26.6–33)
MCHC RBC AUTO-ENTMCNC: 33.9 G/DL (ref 31.5–35.7)
MCV RBC AUTO: 88 FL (ref 79–97)
MONOCYTES # BLD AUTO: 0.4 X10E3/UL (ref 0.1–0.9)
MONOCYTES NFR BLD AUTO: 7 %
NEUTROPHILS # BLD AUTO: 3.8 X10E3/UL (ref 1.4–7)
NEUTROPHILS NFR BLD AUTO: 66 %
PLATELET # BLD AUTO: 295 X10E3/UL (ref 150–450)
RBC # BLD AUTO: 4.39 X10E6/UL (ref 3.77–5.28)
WBC # BLD AUTO: 5.9 X10E3/UL (ref 3.4–10.8)

## 2023-06-03 LAB
ALBUMIN SERPL-MCNC: 4.1 G/DL (ref 3.8–4.9)
ALBUMIN/GLOB SERPL: 1.8 {RATIO} (ref 1.2–2.2)
ALP SERPL-CCNC: 47 IU/L (ref 44–121)
ALT SERPL-CCNC: 9 IU/L (ref 0–32)
AST SERPL-CCNC: 10 IU/L (ref 0–40)
BILIRUB SERPL-MCNC: 0.4 MG/DL (ref 0–1.2)
BUN SERPL-MCNC: 9 MG/DL (ref 6–24)
BUN/CREAT SERPL: 12 (ref 9–23)
CALCIUM SERPL-MCNC: 9.2 MG/DL (ref 8.7–10.2)
CHLORIDE SERPL-SCNC: 106 MMOL/L (ref 96–106)
CHOLEST SERPL-MCNC: 193 MG/DL (ref 100–199)
CO2 SERPL-SCNC: 27 MMOL/L (ref 20–29)
CREAT SERPL-MCNC: 0.76 MG/DL (ref 0.57–1)
EGFRCR SERPLBLD CKD-EPI 2021: 95 ML/MIN/1.73
GLOBULIN SER CALC-MCNC: 2.3 G/DL (ref 1.5–4.5)
GLUCOSE SERPL-MCNC: 86 MG/DL (ref 70–99)
HDLC SERPL-MCNC: 66 MG/DL
LDLC SERPL CALC-MCNC: 118 MG/DL (ref 0–99)
POTASSIUM SERPL-SCNC: 4.4 MMOL/L (ref 3.5–5.2)
PROT SERPL-MCNC: 6.4 G/DL (ref 6–8.5)
SODIUM SERPL-SCNC: 142 MMOL/L (ref 134–144)
TRIGL SERPL-MCNC: 50 MG/DL (ref 0–149)
VLDLC SERPL CALC-MCNC: 9 MG/DL (ref 5–40)

## 2023-10-16 ENCOUNTER — TELEPHONE (OUTPATIENT)
Age: 53
End: 2023-10-16

## 2023-10-16 NOTE — TELEPHONE ENCOUNTER
Pt tested positive for covid today 10/16/23 -her  has it her sympoms started this morning she is asking for paxlovid  to be sent tho her [place]

## 2023-10-16 NOTE — TELEPHONE ENCOUNTER
Please let her know that her prescription for Paxlovid was sent to her pharmacy per her message.     Dr. Greta Castro  [facility] of [address] [place] [address]  Phone: [phone]  Fax: [phone]

## 2023-10-16 NOTE — TELEPHONE ENCOUNTER
Called patient to let her know dr. Lawanda Pena sent in paxlovid for her  and patient is requesting if this can be sent over for her as soon as possible so she can make one trip to the pharmacy. Please advise.

## 2023-11-05 DIAGNOSIS — F41.9 ANXIETY DISORDER, UNSPECIFIED: ICD-10-CM

## 2023-11-06 NOTE — TELEPHONE ENCOUNTER
Last OV: 6/8/2023  No future appointment  Last refill: 3/21/2023      Please call patient to schedule an appointment.

## 2023-11-07 RX ORDER — FLUOXETINE HYDROCHLORIDE 40 MG/1
CAPSULE ORAL
Qty: 90 CAPSULE | Refills: 1 | Status: SHIPPED | OUTPATIENT
Start: 2023-11-07

## 2023-11-16 RX ORDER — RABEPRAZOLE SODIUM 20 MG/1
20 TABLET, DELAYED RELEASE ORAL DAILY
Qty: 90 TABLET | Refills: 0 | Status: SHIPPED | OUTPATIENT
Start: 2023-11-16

## 2023-11-16 NOTE — TELEPHONE ENCOUNTER
Last OV: 6/8/2023  No future appointment  Last refill: 3/20/2023   - 24 kcal FEHM / 24 kcal PM 60/40 q3h (minimum 70 cc per feed)- PO + gavage the rest  - anti-emetics: Zofran + atarax ATC  - D10 1/4 NS @ 20 cc/hr (due to back-up in line)

## 2023-12-08 ENCOUNTER — TELEPHONE (OUTPATIENT)
Age: 53
End: 2023-12-08

## 2023-12-08 NOTE — TELEPHONE ENCOUNTER
Pt called and has had a lingering cough since 12/3/23. Extreme fatigue and post nasal drip. Nyquil and Mucinex not relieving symptoms. Would like to schedule a VV but available appt did not meet pt needs.

## 2023-12-08 NOTE — TELEPHONE ENCOUNTER
Due to lack of appointment availability, please have her schedule to see an urgent care provider over the weekend.     Dr. Susana Ng  [facility] [address]  Phone: [phone]  Fax: [phone]

## 2024-01-23 ENCOUNTER — TRANSCRIBE ORDERS (OUTPATIENT)
Facility: HOSPITAL | Age: 54
End: 2024-01-23

## 2024-01-23 DIAGNOSIS — Z12.31 VISIT FOR SCREENING MAMMOGRAM: Primary | ICD-10-CM

## 2024-02-22 ENCOUNTER — TELEPHONE (OUTPATIENT)
Age: 54
End: 2024-02-22

## 2024-02-22 RX ORDER — RABEPRAZOLE SODIUM 20 MG/1
20 TABLET, DELAYED RELEASE ORAL DAILY
Qty: 90 TABLET | Refills: 1 | Status: SHIPPED | OUTPATIENT
Start: 2024-02-22

## 2024-04-05 ENCOUNTER — HOSPITAL ENCOUNTER (OUTPATIENT)
Facility: HOSPITAL | Age: 54
Discharge: HOME | End: 2024-04-05
Attending: INTERNAL MEDICINE
Payer: COMMERCIAL

## 2024-04-05 VITALS — BODY MASS INDEX: 24.17 KG/M2 | HEIGHT: 61 IN | WEIGHT: 128 LBS

## 2024-04-05 DIAGNOSIS — Z12.31 VISIT FOR SCREENING MAMMOGRAM: ICD-10-CM

## 2024-04-05 PROCEDURE — 77063 BREAST TOMOSYNTHESIS BI: CPT

## 2024-05-10 DIAGNOSIS — E78.5 HYPERLIPIDEMIA, UNSPECIFIED HYPERLIPIDEMIA TYPE: Primary | ICD-10-CM

## 2024-05-10 DIAGNOSIS — J30.9 ALLERGIC RHINITIS, UNSPECIFIED SEASONALITY, UNSPECIFIED TRIGGER: ICD-10-CM

## 2024-05-10 DIAGNOSIS — F41.9 ANXIETY DISORDER, UNSPECIFIED: ICD-10-CM

## 2024-05-10 DIAGNOSIS — K21.9 GASTRO-ESOPHAGEAL REFLUX DISEASE WITHOUT ESOPHAGITIS: ICD-10-CM

## 2024-05-10 RX ORDER — FLUOXETINE HYDROCHLORIDE 40 MG/1
CAPSULE ORAL
Qty: 30 CAPSULE | Refills: 1 | Status: SHIPPED | OUTPATIENT
Start: 2024-05-10

## 2024-05-10 NOTE — TELEPHONE ENCOUNTER
Please schedule her for an appointment next month.  I have not seen her since June of last year.  She will need labs only before her follow-up visit (fasting).    Dr. Chelsey Castle  [facility] of [address], [address]  [place] [address]  Phone: [phone]  Fax: [phone]

## 2024-06-05 DIAGNOSIS — F41.9 ANXIETY DISORDER, UNSPECIFIED: ICD-10-CM

## 2024-06-05 RX ORDER — FLUOXETINE HYDROCHLORIDE 40 MG/1
CAPSULE ORAL
Qty: 90 CAPSULE | Refills: 1 | OUTPATIENT
Start: 2024-06-05

## 2024-07-08 DIAGNOSIS — F41.1 GAD (GENERALIZED ANXIETY DISORDER): ICD-10-CM

## 2024-07-08 RX ORDER — FLUOXETINE 40 MG/1
CAPSULE ORAL DAILY
Qty: 30 CAPSULE | Refills: 1 | OUTPATIENT
Start: 2024-07-08

## 2024-08-19 DIAGNOSIS — F41.1 GAD (GENERALIZED ANXIETY DISORDER): ICD-10-CM

## 2024-08-19 RX ORDER — RABEPRAZOLE SODIUM 20 MG/1
20 TABLET, DELAYED RELEASE ORAL DAILY
Qty: 90 TABLET | Refills: 1 | OUTPATIENT
Start: 2024-08-19

## 2024-08-19 NOTE — TELEPHONE ENCOUNTER
Patient requesting medication refill patient states medication (Fluoxetine) not able to get from pharmacy to early to fill.       FLUoxetine (PROZAC) 40 MG capsule      RABEprazole (ACIPHEX) 20 MG tablet     [facility]/pharmacy #[id number] - [place], [place] - [address] - P [phone] - F [phone] [address], [facility] [address]  Phone: [phone]  Fax: [phone]

## 2024-08-20 RX ORDER — FLUOXETINE HYDROCHLORIDE 40 MG/1
40 CAPSULE ORAL DAILY
Qty: 90 CAPSULE | Refills: 0 | Status: SHIPPED | OUTPATIENT
Start: 2024-08-20

## 2024-08-20 RX ORDER — RABEPRAZOLE SODIUM 20 MG/1
20 TABLET, DELAYED RELEASE ORAL DAILY
Qty: 90 TABLET | Refills: 0 | Status: SHIPPED | OUTPATIENT
Start: 2024-08-20

## 2024-09-09 ENCOUNTER — APPOINTMENT (OUTPATIENT)
Facility: HOSPITAL | Age: 54
End: 2024-09-09
Payer: COMMERCIAL

## 2024-09-09 ENCOUNTER — HOSPITAL ENCOUNTER (EMERGENCY)
Facility: HOSPITAL | Age: 54
Discharge: HOME | End: 2024-09-09
Attending: STUDENT IN AN ORGANIZED HEALTH CARE EDUCATION/TRAINING PROGRAM
Payer: COMMERCIAL

## 2024-09-09 VITALS
OXYGEN SATURATION: 99 % | HEART RATE: 76 BPM | TEMPERATURE: 97.1 F | RESPIRATION RATE: 18 BRPM | HEIGHT: 61 IN | DIASTOLIC BLOOD PRESSURE: 65 MMHG | SYSTOLIC BLOOD PRESSURE: 99 MMHG | BODY MASS INDEX: 24.35 KG/M2 | WEIGHT: 129 LBS

## 2024-09-09 DIAGNOSIS — M54.50 ACUTE LOW BACK PAIN, UNSPECIFIED BACK PAIN LATERALITY, UNSPECIFIED WHETHER SCIATICA PRESENT: Primary | ICD-10-CM

## 2024-09-09 LAB
ALBUMIN SERPL-MCNC: 3.9 G/DL (ref 3.4–5)
ALBUMIN/GLOB SERPL: 1.2 (ref 0.8–1.7)
ALP SERPL-CCNC: 59 U/L (ref 45–117)
ALT SERPL-CCNC: 15 U/L (ref 13–56)
ANION GAP SERPL CALC-SCNC: 3 MMOL/L (ref 3–18)
AST SERPL-CCNC: 12 U/L (ref 10–38)
BASOPHILS # BLD: 0.1 K/UL (ref 0–0.1)
BASOPHILS NFR BLD: 1 % (ref 0–2)
BILIRUB SERPL-MCNC: 0.3 MG/DL (ref 0.2–1)
BUN SERPL-MCNC: 8 MG/DL (ref 7–18)
BUN/CREAT SERPL: 11 (ref 12–20)
CALCIUM SERPL-MCNC: 9 MG/DL (ref 8.5–10.1)
CHLORIDE SERPL-SCNC: 108 MMOL/L (ref 100–111)
CO2 SERPL-SCNC: 31 MMOL/L (ref 21–32)
CREAT SERPL-MCNC: 0.74 MG/DL (ref 0.6–1.3)
DIFFERENTIAL METHOD BLD: NORMAL
EOSINOPHIL # BLD: 0.1 K/UL (ref 0–0.4)
EOSINOPHIL NFR BLD: 2 % (ref 0–5)
ERYTHROCYTE [DISTWIDTH] IN BLOOD BY AUTOMATED COUNT: 12.4 % (ref 11.6–14.5)
GLOBULIN SER CALC-MCNC: 3.3 G/DL (ref 2–4)
GLUCOSE SERPL-MCNC: 68 MG/DL (ref 74–99)
HCT VFR BLD AUTO: 40.4 % (ref 35–45)
HGB BLD-MCNC: 13.3 G/DL (ref 12–16)
IMM GRANULOCYTES # BLD AUTO: 0 K/UL (ref 0–0.04)
IMM GRANULOCYTES NFR BLD AUTO: 0 % (ref 0–0.5)
LYMPHOCYTES # BLD: 1.7 K/UL (ref 0.9–3.6)
LYMPHOCYTES NFR BLD: 34 % (ref 21–52)
MCH RBC QN AUTO: 29.6 PG (ref 24–34)
MCHC RBC AUTO-ENTMCNC: 32.9 G/DL (ref 31–37)
MCV RBC AUTO: 90 FL (ref 78–100)
MONOCYTES # BLD: 0.4 K/UL (ref 0.05–1.2)
MONOCYTES NFR BLD: 8 % (ref 3–10)
NEUTS SEG # BLD: 2.7 K/UL (ref 1.8–8)
NEUTS SEG NFR BLD: 54 % (ref 40–73)
NRBC # BLD: 0 K/UL (ref 0–0.01)
NRBC BLD-RTO: 0 PER 100 WBC
PLATELET # BLD AUTO: 301 K/UL (ref 135–420)
PMV BLD AUTO: 9.3 FL (ref 9.2–11.8)
POTASSIUM SERPL-SCNC: 3.9 MMOL/L (ref 3.5–5.5)
PROT SERPL-MCNC: 7.2 G/DL (ref 6.4–8.2)
RBC # BLD AUTO: 4.49 M/UL (ref 4.2–5.3)
SODIUM SERPL-SCNC: 142 MMOL/L (ref 136–145)
WBC # BLD AUTO: 4.9 K/UL (ref 4.6–13.2)

## 2024-09-09 PROCEDURE — 72125 CT NECK SPINE W/O DYE: CPT

## 2024-09-09 PROCEDURE — 99285 EMERGENCY DEPT VISIT HI MDM: CPT

## 2024-09-09 PROCEDURE — 6360000002 HC RX W HCPCS: Performed by: NURSE PRACTITIONER

## 2024-09-09 PROCEDURE — 73552 X-RAY EXAM OF FEMUR 2/>: CPT

## 2024-09-09 PROCEDURE — 85025 COMPLETE CBC W/AUTO DIFF WBC: CPT

## 2024-09-09 PROCEDURE — 72148 MRI LUMBAR SPINE W/O DYE: CPT

## 2024-09-09 PROCEDURE — 72146 MRI CHEST SPINE W/O DYE: CPT

## 2024-09-09 PROCEDURE — 6370000000 HC RX 637 (ALT 250 FOR IP): Performed by: STUDENT IN AN ORGANIZED HEALTH CARE EDUCATION/TRAINING PROGRAM

## 2024-09-09 PROCEDURE — 70450 CT HEAD/BRAIN W/O DYE: CPT

## 2024-09-09 PROCEDURE — 72141 MRI NECK SPINE W/O DYE: CPT

## 2024-09-09 PROCEDURE — 6360000004 HC RX CONTRAST MEDICATION: Performed by: NURSE PRACTITIONER

## 2024-09-09 PROCEDURE — 71260 CT THORAX DX C+: CPT

## 2024-09-09 PROCEDURE — 80053 COMPREHEN METABOLIC PANEL: CPT

## 2024-09-09 PROCEDURE — 6370000000 HC RX 637 (ALT 250 FOR IP): Performed by: NURSE PRACTITIONER

## 2024-09-09 PROCEDURE — 96374 THER/PROPH/DIAG INJ IV PUSH: CPT

## 2024-09-09 RX ORDER — ACETAMINOPHEN 325 MG/1
650 TABLET ORAL
Status: COMPLETED | OUTPATIENT
Start: 2024-09-09 | End: 2024-09-09

## 2024-09-09 RX ORDER — KETOROLAC TROMETHAMINE 15 MG/ML
15 INJECTION, SOLUTION INTRAMUSCULAR; INTRAVENOUS EVERY 6 HOURS PRN
Status: DISCONTINUED | OUTPATIENT
Start: 2024-09-09 | End: 2024-09-10 | Stop reason: HOSPADM

## 2024-09-09 RX ORDER — IOPAMIDOL 612 MG/ML
100 INJECTION, SOLUTION INTRAVASCULAR
Status: COMPLETED | OUTPATIENT
Start: 2024-09-09 | End: 2024-09-09

## 2024-09-09 RX ORDER — HYDROCODONE BITARTRATE AND ACETAMINOPHEN 5; 325 MG/1; MG/1
1 TABLET ORAL
Status: COMPLETED | OUTPATIENT
Start: 2024-09-09 | End: 2024-09-09

## 2024-09-09 RX ORDER — METHOCARBAMOL 750 MG/1
750 TABLET, FILM COATED ORAL 3 TIMES DAILY
Qty: 15 TABLET | Refills: 0 | Status: SHIPPED | OUTPATIENT
Start: 2024-09-09 | End: 2024-09-14

## 2024-09-09 RX ORDER — HYDROCODONE BITARTRATE AND ACETAMINOPHEN 5; 325 MG/1; MG/1
1 TABLET ORAL EVERY 6 HOURS PRN
Qty: 10 TABLET | Refills: 0 | Status: SHIPPED | OUTPATIENT
Start: 2024-09-09 | End: 2024-09-12

## 2024-09-09 RX ADMIN — KETOROLAC TROMETHAMINE 15 MG: 15 INJECTION, SOLUTION INTRAMUSCULAR; INTRAVENOUS at 18:14

## 2024-09-09 RX ADMIN — HYDROCODONE BITARTRATE AND ACETAMINOPHEN 1 TABLET: 5; 325 TABLET ORAL at 23:42

## 2024-09-09 RX ADMIN — ACETAMINOPHEN 325MG 650 MG: 325 TABLET ORAL at 18:14

## 2024-09-09 RX ADMIN — IOPAMIDOL 100 ML: 612 INJECTION, SOLUTION INTRAVENOUS at 16:56

## 2024-09-09 ASSESSMENT — PAIN SCALES - GENERAL
PAINLEVEL_OUTOF10: 4
PAINLEVEL_OUTOF10: 5
PAINLEVEL_OUTOF10: 8
PAINLEVEL_OUTOF10: 9

## 2024-09-09 ASSESSMENT — PAIN DESCRIPTION - ORIENTATION
ORIENTATION: LOWER
ORIENTATION: LOWER

## 2024-09-09 ASSESSMENT — PAIN DESCRIPTION - LOCATION
LOCATION: BACK
LOCATION: BACK
LOCATION: BACK;LEG

## 2024-09-09 ASSESSMENT — PAIN - FUNCTIONAL ASSESSMENT: PAIN_FUNCTIONAL_ASSESSMENT: 0-10

## 2024-09-09 ASSESSMENT — PAIN DESCRIPTION - DESCRIPTORS: DESCRIPTORS: GNAWING;NAGGING

## 2024-09-09 ASSESSMENT — PAIN DESCRIPTION - PAIN TYPE: TYPE: ACUTE PAIN

## 2024-09-12 ENCOUNTER — OFFICE VISIT (OUTPATIENT)
Facility: CLINIC | Age: 54
End: 2024-09-12
Payer: COMMERCIAL

## 2024-09-12 VITALS
BODY MASS INDEX: 24.84 KG/M2 | HEIGHT: 61 IN | SYSTOLIC BLOOD PRESSURE: 114 MMHG | OXYGEN SATURATION: 96 % | TEMPERATURE: 97 F | HEART RATE: 62 BPM | DIASTOLIC BLOOD PRESSURE: 75 MMHG | WEIGHT: 131.6 LBS | RESPIRATION RATE: 16 BRPM

## 2024-09-12 DIAGNOSIS — J98.4 LUNG CYST: ICD-10-CM

## 2024-09-12 DIAGNOSIS — E04.1 THYROID NODULE: ICD-10-CM

## 2024-09-12 DIAGNOSIS — K21.9 GASTROESOPHAGEAL REFLUX DISEASE WITHOUT ESOPHAGITIS: ICD-10-CM

## 2024-09-12 DIAGNOSIS — Z13.6 SCREENING FOR CARDIOVASCULAR CONDITION: ICD-10-CM

## 2024-09-12 DIAGNOSIS — M54.14 THORACIC RADICULOPATHY: Primary | ICD-10-CM

## 2024-09-12 DIAGNOSIS — M54.40 ACUTE LOW BACK PAIN WITH SCIATICA, SCIATICA LATERALITY UNSPECIFIED, UNSPECIFIED BACK PAIN LATERALITY: ICD-10-CM

## 2024-09-12 PROCEDURE — 99214 OFFICE O/P EST MOD 30 MIN: CPT | Performed by: STUDENT IN AN ORGANIZED HEALTH CARE EDUCATION/TRAINING PROGRAM

## 2024-09-12 ASSESSMENT — PATIENT HEALTH QUESTIONNAIRE - PHQ9
SUM OF ALL RESPONSES TO PHQ QUESTIONS 1-9: 0
2. FEELING DOWN, DEPRESSED OR HOPELESS: NOT AT ALL
SUM OF ALL RESPONSES TO PHQ QUESTIONS 1-9: 0
1. LITTLE INTEREST OR PLEASURE IN DOING THINGS: NOT AT ALL
SUM OF ALL RESPONSES TO PHQ9 QUESTIONS 1 & 2: 0

## 2024-09-26 DIAGNOSIS — K21.9 GASTROESOPHAGEAL REFLUX DISEASE WITHOUT ESOPHAGITIS: ICD-10-CM

## 2024-11-18 RX ORDER — RABEPRAZOLE SODIUM 20 MG/1
20 TABLET, DELAYED RELEASE ORAL DAILY
Qty: 90 TABLET | Refills: 0 | Status: SHIPPED | OUTPATIENT
Start: 2024-11-18

## 2024-11-18 NOTE — TELEPHONE ENCOUNTER
Last appointment: 06/10/2024    Next appointment: 12/04/2024    Pharmacy requesting 90 day refill for     RABEprazole (ACIPHEX) 20 MG tablet [[id number]]    Pharmacy   [facility]/pharmacy #[id number] - [place] - [address] - P [phone] - F [phone] (Pharmacy)

## 2024-11-26 ENCOUNTER — HOSPITAL ENCOUNTER (OUTPATIENT)
Facility: HOSPITAL | Age: 54
Discharge: HOME | End: 2024-11-29
Payer: COMMERCIAL

## 2024-11-26 DIAGNOSIS — E04.1 THYROID NODULE: ICD-10-CM

## 2024-11-26 PROCEDURE — 76536 US EXAM OF HEAD AND NECK: CPT

## 2024-11-29 DIAGNOSIS — E04.1 THYROID NODULE: Primary | ICD-10-CM

## 2024-11-29 NOTE — RESULT ENCOUNTER NOTE
You had a large thyroid nodule requiring biopsy.  We need to make sure that the thyroid nodule is benign.  I am referring you to endocrinology for the biopsy. Phone number to schedule is [phone]  Dr Haas ( I would wait 1-2 weeks to schedule appointment after referral is sent by nurse). If you have any issues with referral please let us know

## 2024-12-04 ENCOUNTER — OFFICE VISIT (OUTPATIENT)
Facility: CLINIC | Age: 54
End: 2024-12-04

## 2024-12-04 VITALS
SYSTOLIC BLOOD PRESSURE: 114 MMHG | TEMPERATURE: 98 F | OXYGEN SATURATION: 96 % | BODY MASS INDEX: 24.96 KG/M2 | RESPIRATION RATE: 16 BRPM | WEIGHT: 132.2 LBS | HEIGHT: 61 IN | DIASTOLIC BLOOD PRESSURE: 77 MMHG | HEART RATE: 63 BPM

## 2024-12-04 DIAGNOSIS — Z13.6 SCREENING FOR CARDIOVASCULAR CONDITION: ICD-10-CM

## 2024-12-04 DIAGNOSIS — F41.1 GAD (GENERALIZED ANXIETY DISORDER): ICD-10-CM

## 2024-12-04 DIAGNOSIS — H61.90 LESION OF SKIN OF EAR: Primary | ICD-10-CM

## 2024-12-04 DIAGNOSIS — Z00.00 WELLNESS EXAMINATION: ICD-10-CM

## 2024-12-04 RX ORDER — RABEPRAZOLE SODIUM 20 MG/1
20 TABLET, DELAYED RELEASE ORAL DAILY
Qty: 90 TABLET | Refills: 1 | Status: SHIPPED | OUTPATIENT
Start: 2024-12-04

## 2024-12-04 NOTE — PROGRESS NOTES
\"Have you been to the ER, urgent care clinic since your last visit?  Hospitalized since your last visit?\"    NO    “Have you seen or consulted any other health care providers outside our system since your last visit?”    NO           
  Temp: 98 °F (36.7 °C)   TempSrc: Temporal   SpO2: 96%   Weight: 60 kg (132 lb 3.2 oz)   Height: 1.549 m (5' 1\")       Body mass index is 24.98 kg/m².   Wt Readings from Last 3 Encounters:   12/04/24 60 kg (132 lb 3.2 oz)   09/12/24 59.7 kg (131 lb 9.6 oz)   09/09/24 58.5 kg (129 lb)     General:  alert, cooperative, well appearing, in no apparent distress.  Eyes:   The conjunctiva is clear and noninjected.  There is no sclera icterus or conjunctival pallor.  CV:  The heart sounds are regular in rate and rhythm.  There is a normal S1 and S2.  There or no murmurs.  Lungs:  No increased work of breathing, Lung sounds are clear and equal to auscultation throughout all lung fields without wheezing, rales, or rhonchi.  GI:  The abdomen is soft with no tenderness.  Bowel sounds are present and normal. There is no rebound or guarding.      Psych: normal affect.       Lesion on outter ear lobe and moles on skin    ASSESSMENT / PLAN    Well exam  Lipid panel      Preventative  -Lung 19 mm cyst incidental in the left lower lobe incidental will repeat in 1 year  or refer to pulm  Likely benign  -Thyroid nodule  TSH  Fu with endocrinology has phone #  -moles and ear lesion  Referred to dermatology     This note was dictated utilizing voice recognition software which may lead to typographical errors.  I apologize in advance if the situation occurs.  If questions arise please do not hesitate to contact me or call our department.    An electronic signature was used to authenticate this note.    --Regina Banuelos MD

## 2024-12-05 RX ORDER — FLUOXETINE 40 MG/1
40 CAPSULE ORAL DAILY
Qty: 90 CAPSULE | Refills: 1 | Status: SHIPPED | OUTPATIENT
Start: 2024-12-05

## 2024-12-05 NOTE — TELEPHONE ENCOUNTER
Last Visit: 12- OV   Next Appointment: none  Previous Refill Encounter: 08- #90 caps with 0 refills      Requested Prescriptions     Pending Prescriptions Disp Refills    FLUoxetine (PROZAC) 40 MG capsule 90 capsule 0     Sig: Take 1 capsule by mouth daily

## 2024-12-13 LAB
CHOLEST SERPL-MCNC: 214 MG/DL (ref 100–199)
HDLC SERPL-MCNC: 54 MG/DL
LDLC SERPL CALC-MCNC: 151 MG/DL (ref 0–99)
SPECIMEN STATUS REPORT: NORMAL
T4 FREE SERPL-MCNC: 1.06 NG/DL (ref 0.82–1.77)
TRIGL SERPL-MCNC: 49 MG/DL (ref 0–149)
TSH SERPL DL<=0.005 MIU/L-ACNC: 1.06 UIU/ML (ref 0.45–4.5)
VLDLC SERPL CALC-MCNC: 9 MG/DL (ref 5–40)

## 2024-12-13 NOTE — RESULT ENCOUNTER NOTE
Your cholesterol was high. Please eat healthy ( increase fiber, and vegetables), do not eat too much animal product ( red meats, egg yolk, butter, cheese). Increase omega 3 eg fish, flaxseed/keeley seed, walnuts etc.., olive oil. Avoid too much sweets. Limit alcohol use.  Also exercise. Please make an appointment in 3 months with lifestyle changes and we will re-check your cholesterol blood work.

## 2025-01-24 ENCOUNTER — TRANSCRIBE ORDERS (OUTPATIENT)
Facility: HOSPITAL | Age: 55
End: 2025-01-24

## 2025-01-24 DIAGNOSIS — G50.0 TRIGEMINAL NEURALGIA: ICD-10-CM

## 2025-01-24 DIAGNOSIS — G43.011 INTRACTABLE MIGRAINE WITHOUT AURA AND WITH STATUS MIGRAINOSUS: Primary | ICD-10-CM

## 2025-02-25 ENCOUNTER — HOSPITAL ENCOUNTER (OUTPATIENT)
Facility: HOSPITAL | Age: 55
Discharge: HOME | End: 2025-02-28
Payer: COMMERCIAL

## 2025-02-25 DIAGNOSIS — G50.0 TRIGEMINAL NEURALGIA: ICD-10-CM

## 2025-02-25 DIAGNOSIS — G43.009 MIGRAINE WITHOUT AURA, NOT INTRACTABLE, WITHOUT STATUS MIGRAINOSUS: ICD-10-CM

## 2025-02-25 PROCEDURE — 70551 MRI BRAIN STEM W/O DYE: CPT

## 2025-04-11 SDOH — ECONOMIC STABILITY: FOOD INSECURITY: WITHIN THE PAST 12 MONTHS, THE FOOD YOU BOUGHT JUST DIDN'T LAST AND YOU DIDN'T HAVE MONEY TO GET MORE.: NEVER TRUE

## 2025-04-11 SDOH — ECONOMIC STABILITY: INCOME INSECURITY: IN THE LAST 12 MONTHS, WAS THERE A TIME WHEN YOU WERE NOT ABLE TO PAY THE MORTGAGE OR RENT ON TIME?: NO

## 2025-04-11 SDOH — ECONOMIC STABILITY: FOOD INSECURITY: WITHIN THE PAST 12 MONTHS, YOU WORRIED THAT YOUR FOOD WOULD RUN OUT BEFORE YOU GOT MONEY TO BUY MORE.: NEVER TRUE

## 2025-04-11 SDOH — ECONOMIC STABILITY: TRANSPORTATION INSECURITY
IN THE PAST 12 MONTHS, HAS LACK OF TRANSPORTATION KEPT YOU FROM MEETINGS, WORK, OR FROM GETTING THINGS NEEDED FOR DAILY LIVING?: NO

## 2025-04-11 SDOH — ECONOMIC STABILITY: TRANSPORTATION INSECURITY
IN THE PAST 12 MONTHS, HAS THE LACK OF TRANSPORTATION KEPT YOU FROM MEDICAL APPOINTMENTS OR FROM GETTING MEDICATIONS?: NO

## 2025-04-14 ENCOUNTER — OFFICE VISIT (OUTPATIENT)
Facility: CLINIC | Age: 55
End: 2025-04-14
Payer: COMMERCIAL

## 2025-04-14 VITALS
TEMPERATURE: 97.9 F | DIASTOLIC BLOOD PRESSURE: 73 MMHG | OXYGEN SATURATION: 94 % | WEIGHT: 129.8 LBS | SYSTOLIC BLOOD PRESSURE: 108 MMHG | HEIGHT: 61 IN | RESPIRATION RATE: 12 BRPM | BODY MASS INDEX: 24.51 KG/M2 | HEART RATE: 60 BPM

## 2025-04-14 DIAGNOSIS — K21.9 GASTROESOPHAGEAL REFLUX DISEASE WITHOUT ESOPHAGITIS: ICD-10-CM

## 2025-04-14 DIAGNOSIS — E04.1 THYROID NODULE: ICD-10-CM

## 2025-04-14 DIAGNOSIS — F41.1 GAD (GENERALIZED ANXIETY DISORDER): ICD-10-CM

## 2025-04-14 DIAGNOSIS — E78.5 HYPERLIPIDEMIA, UNSPECIFIED HYPERLIPIDEMIA TYPE: Primary | ICD-10-CM

## 2025-04-14 PROCEDURE — 99214 OFFICE O/P EST MOD 30 MIN: CPT | Performed by: STUDENT IN AN ORGANIZED HEALTH CARE EDUCATION/TRAINING PROGRAM

## 2025-04-14 NOTE — PROGRESS NOTES
Subjective:  Amanda is a 54 y.o. y.o. female who presents for following.       BASILIO   Taking meds, no concerns  Well controlled      ROS:   General: no fever  CV:  no chest pain  Resp: no shortness of breath  AB: no  abdominal pain     Past Medical History:   Diagnosis Date    Anxiety     Cortical age-related cataract of left eye 2021    Cortical age-related cataract of right eye 2021    GERD (gastroesophageal reflux disease)     IBS (irritable bowel syndrome)     Migraine     Nausea & vomiting       Past Surgical History:   Procedure Laterality Date    BREAST ENHANCEMENT SURGERY      BREAST LUMPECTOMY Left     Benign    BREAST RECONSTRUCTION      CATARACT REMOVAL Right 2021    EYE SURGERY  cataract    HEENT      jaw joint replacement left    HYSTERECTOMY (CERVIX STATUS UNKNOWN)      HYSTERECTOMY, VAGINAL      IMPLANT BREAST SILICONE/EQ      JOINT REPLACEMENT  jaw      Social History     Socioeconomic History    Marital status:    Tobacco Use    Smoking status: Former     Current packs/day: 0.00     Types: Cigarettes     Quit date: 2001     Years since quittin.8    Smokeless tobacco: Never    Tobacco comments:     social smoker when in 20's   Substance and Sexual Activity    Alcohol use: Yes     Alcohol/week: 4.0 standard drinks of alcohol     Types: 4 Drinks containing 0.5 oz of alcohol per week     Comment: social drinks with neighbors    Drug use: No    Sexual activity: Yes     Partners: Male     Social Drivers of Health     Financial Resource Strain: Low Risk  (6/10/2024)    Overall Financial Resource Strain (CARDIA)     Difficulty of Paying Living Expenses: Not very hard   Transportation Needs: Unknown (6/10/2024)    PRAPARE - Transportation     Lack of Transportation (Non-Medical): No   Housing Stability: Unknown (2025)    Housing Stability Vital Sign     Number of Times Moved in the Last Year: 0     Homeless in the Last Year: No      Current Outpatient Medications

## 2025-04-22 LAB
BUN SERPL-MCNC: 9 MG/DL (ref 6–24)
BUN/CREAT SERPL: 12 (ref 9–23)
CALCIUM SERPL-MCNC: 8.9 MG/DL (ref 8.7–10.2)
CHLORIDE SERPL-SCNC: 105 MMOL/L (ref 96–106)
CHOLEST SERPL-MCNC: 188 MG/DL (ref 100–199)
CO2 SERPL-SCNC: 25 MMOL/L (ref 20–29)
CREAT SERPL-MCNC: 0.74 MG/DL (ref 0.57–1)
EGFRCR SERPLBLD CKD-EPI 2021: 97 ML/MIN/1.73
GLUCOSE SERPL-MCNC: 81 MG/DL (ref 70–99)
HDLC SERPL-MCNC: 62 MG/DL
LDLC SERPL CALC-MCNC: 116 MG/DL (ref 0–99)
POTASSIUM SERPL-SCNC: 4.2 MMOL/L (ref 3.5–5.2)
SODIUM SERPL-SCNC: 142 MMOL/L (ref 134–144)
SPECIMEN STATUS REPORT: NORMAL
TRIGL SERPL-MCNC: 51 MG/DL (ref 0–149)
VLDLC SERPL CALC-MCNC: 10 MG/DL (ref 5–40)

## 2025-05-19 ENCOUNTER — OFFICE VISIT (OUTPATIENT)
Facility: CLINIC | Age: 55
End: 2025-05-19
Payer: COMMERCIAL

## 2025-05-19 VITALS
RESPIRATION RATE: 12 BRPM | TEMPERATURE: 97.7 F | HEART RATE: 74 BPM | DIASTOLIC BLOOD PRESSURE: 69 MMHG | WEIGHT: 128.2 LBS | SYSTOLIC BLOOD PRESSURE: 101 MMHG | BODY MASS INDEX: 24.2 KG/M2 | HEIGHT: 61 IN | OXYGEN SATURATION: 98 %

## 2025-05-19 DIAGNOSIS — J20.9 ACUTE BRONCHITIS, UNSPECIFIED ORGANISM: Primary | ICD-10-CM

## 2025-05-19 PROCEDURE — 99213 OFFICE O/P EST LOW 20 MIN: CPT | Performed by: STUDENT IN AN ORGANIZED HEALTH CARE EDUCATION/TRAINING PROGRAM

## 2025-05-19 RX ORDER — FLUTICASONE PROPIONATE 50 MCG
2 SPRAY, SUSPENSION (ML) NASAL DAILY
Qty: 1 EACH | Refills: 0 | Status: SHIPPED | OUTPATIENT
Start: 2025-05-19

## 2025-05-19 RX ORDER — BENZONATATE 100 MG/1
100 CAPSULE ORAL 3 TIMES DAILY PRN
Qty: 30 CAPSULE | Refills: 0 | Status: SHIPPED | OUTPATIENT
Start: 2025-05-19 | End: 2025-05-29

## 2025-05-19 RX ORDER — AZITHROMYCIN 250 MG/1
TABLET, FILM COATED ORAL
Qty: 6 TABLET | Refills: 0 | Status: SHIPPED | OUTPATIENT
Start: 2025-05-19 | End: 2025-05-29

## 2025-05-19 RX ORDER — ALBUTEROL SULFATE 90 UG/1
2 INHALANT RESPIRATORY (INHALATION) 4 TIMES DAILY PRN
Qty: 1 EACH | Refills: 0 | Status: SHIPPED | OUTPATIENT
Start: 2025-05-19

## 2025-05-19 RX ORDER — FLUCONAZOLE 150 MG/1
150 TABLET ORAL ONCE
Qty: 1 TABLET | Refills: 0 | Status: SHIPPED | OUTPATIENT
Start: 2025-05-19 | End: 2025-05-19

## 2025-05-19 ASSESSMENT — PATIENT HEALTH QUESTIONNAIRE - PHQ9
SUM OF ALL RESPONSES TO PHQ QUESTIONS 1-9: 0
1. LITTLE INTEREST OR PLEASURE IN DOING THINGS: NOT AT ALL
2. FEELING DOWN, DEPRESSED OR HOPELESS: NOT AT ALL
SUM OF ALL RESPONSES TO PHQ QUESTIONS 1-9: 0

## 2025-05-19 NOTE — PROGRESS NOTES
Subjective:  Amanda is a 54 y.o. y.o. female who presents for following.       Cough  Onset:  1 weeks  Productive cough?  Wet now bronchitis   Any Fevers? No  Any flu like body  Aches: ?No  Any Sore throat? No  Any Nasal congestion / rhinorrhea? Yes  Any Sinus pressure?Yes  Any Headaches? Yes  Any Shortness of breath ? Some wheezing       MISC  Any Asthma ? Yes  Any significant smoking history or current smoker?No      Tested for covid? No          ROS:   General: no fever  CV:  no chest pain  Resp: no shortness of breath  AB: no  abdominal pain     Past Medical History:   Diagnosis Date    Anxiety     Cortical age-related cataract of left eye 2021    Cortical age-related cataract of right eye 2021    GERD (gastroesophageal reflux disease)     IBS (irritable bowel syndrome)     Migraine     Nausea & vomiting       Past Surgical History:   Procedure Laterality Date    BREAST ENHANCEMENT SURGERY      BREAST LUMPECTOMY Left     Benign    BREAST RECONSTRUCTION      CATARACT REMOVAL Right 2021    EYE SURGERY  cataract    HEENT      jaw joint replacement left    HYSTERECTOMY (CERVIX STATUS UNKNOWN)      HYSTERECTOMY, VAGINAL      IMPLANT BREAST SILICONE/EQ      JOINT REPLACEMENT  jaw      Social History     Socioeconomic History    Marital status:    Tobacco Use    Smoking status: Former     Current packs/day: 0.00     Types: Cigarettes     Quit date: 2001     Years since quittin.9    Smokeless tobacco: Never    Tobacco comments:     social smoker when in 20's   Substance and Sexual Activity    Alcohol use: Yes     Alcohol/week: 4.0 standard drinks of alcohol     Types: 4 Drinks containing 0.5 oz of alcohol per week     Comment: social drinks with neighbors    Drug use: No    Sexual activity: Yes     Partners: Male     Social Drivers of Health     Financial Resource Strain: Low Risk  (6/10/2024)    Overall Financial Resource Strain (CARDIA)     Difficulty of Paying Living Expenses: Not

## 2025-05-22 ENCOUNTER — TRANSCRIBE ORDERS (OUTPATIENT)
Facility: HOSPITAL | Age: 55
End: 2025-05-22

## 2025-05-22 DIAGNOSIS — Z12.31 VISIT FOR SCREENING MAMMOGRAM: Primary | ICD-10-CM

## 2025-06-10 ENCOUNTER — TELEPHONE (OUTPATIENT)
Facility: CLINIC | Age: 55
End: 2025-06-10

## 2025-06-10 NOTE — TELEPHONE ENCOUNTER
Last appointment: 04/14/2025    Next appointment: none    Pharmacy requesting refill for   fluticasone (FLONASE) 50 MCG/ACT nasal spray [[id number]]    Pharmacy     [facility]/pharmacy #[id number] - [place], [place] - [address] - P [phone] - F [phone] (Pharmacy)

## 2025-06-11 DIAGNOSIS — J20.9 ACUTE BRONCHITIS, UNSPECIFIED ORGANISM: ICD-10-CM

## 2025-06-11 RX ORDER — FLUTICASONE PROPIONATE 50 MCG
2 SPRAY, SUSPENSION (ML) NASAL DAILY
Qty: 1 EACH | Refills: 2 | Status: SHIPPED | OUTPATIENT
Start: 2025-06-11

## 2025-07-10 DIAGNOSIS — F41.1 GAD (GENERALIZED ANXIETY DISORDER): ICD-10-CM

## 2025-07-10 RX ORDER — FLUOXETINE HYDROCHLORIDE 40 MG/1
40 CAPSULE ORAL DAILY
Qty: 90 CAPSULE | Refills: 1 | Status: SHIPPED | OUTPATIENT
Start: 2025-07-10

## 2025-07-17 ENCOUNTER — HOSPITAL ENCOUNTER (OUTPATIENT)
Facility: HOSPITAL | Age: 55
Discharge: HOME | End: 2025-07-20
Attending: STUDENT IN AN ORGANIZED HEALTH CARE EDUCATION/TRAINING PROGRAM
Payer: COMMERCIAL

## 2025-07-17 VITALS — HEIGHT: 61 IN | BODY MASS INDEX: 24.55 KG/M2 | WEIGHT: 130 LBS

## 2025-07-17 DIAGNOSIS — Z12.31 VISIT FOR SCREENING MAMMOGRAM: ICD-10-CM

## 2025-07-17 PROCEDURE — 77063 BREAST TOMOSYNTHESIS BI: CPT

## 2025-07-28 ENCOUNTER — OFFICE VISIT (OUTPATIENT)
Facility: CLINIC | Age: 55
End: 2025-07-28
Payer: COMMERCIAL

## 2025-07-28 VITALS
DIASTOLIC BLOOD PRESSURE: 66 MMHG | SYSTOLIC BLOOD PRESSURE: 106 MMHG | TEMPERATURE: 97.8 F | HEIGHT: 61 IN | RESPIRATION RATE: 12 BRPM | WEIGHT: 133.6 LBS | BODY MASS INDEX: 25.22 KG/M2 | OXYGEN SATURATION: 97 % | HEART RATE: 63 BPM

## 2025-07-28 DIAGNOSIS — E78.5 HYPERLIPIDEMIA, UNSPECIFIED HYPERLIPIDEMIA TYPE: ICD-10-CM

## 2025-07-28 DIAGNOSIS — R21 RASH: Primary | ICD-10-CM

## 2025-07-28 PROCEDURE — 99214 OFFICE O/P EST MOD 30 MIN: CPT | Performed by: STUDENT IN AN ORGANIZED HEALTH CARE EDUCATION/TRAINING PROGRAM

## 2025-07-28 RX ORDER — TRIAMCINOLONE ACETONIDE 1 MG/G
OINTMENT TOPICAL
Qty: 1 EACH | Refills: 1 | Status: SHIPPED | OUTPATIENT
Start: 2025-07-28

## 2025-07-28 RX ORDER — HYDROXYZINE HYDROCHLORIDE 25 MG/1
25 TABLET, FILM COATED ORAL EVERY 8 HOURS PRN
Qty: 30 TABLET | Refills: 0 | Status: SHIPPED | OUTPATIENT
Start: 2025-07-28 | End: 2025-08-07

## 2025-07-28 NOTE — PROGRESS NOTES
Have you been to the ER, urgent care clinic since your last visit?  Hospitalized since your last visit?   NO    Have you seen or consulted any other health care providers outside our system since your last visit?   NO           
endocrinology provided phone number to call and schedule appointment      This note was dictated utilizing voice recognition software which may lead to typographical errors.  I apologize in advance if the situation occurs.  If questions arise please do not hesitate to contact me or call our department.    An electronic signature was used to authenticate this note.    --Regina Banuelos MD

## 2025-08-15 RX ORDER — RABEPRAZOLE SODIUM 20 MG/1
20 TABLET, DELAYED RELEASE ORAL DAILY
Qty: 90 TABLET | Refills: 1 | Status: SHIPPED | OUTPATIENT
Start: 2025-08-15

## 2025-09-05 DIAGNOSIS — J20.9 ACUTE BRONCHITIS, UNSPECIFIED ORGANISM: ICD-10-CM

## 2025-09-07 RX ORDER — FLUTICASONE PROPIONATE 50 MCG
2 SPRAY, SUSPENSION (ML) NASAL DAILY
Qty: 1 EACH | Refills: 2 | Status: SHIPPED | OUTPATIENT
Start: 2025-09-07